# Patient Record
Sex: FEMALE | Race: WHITE | NOT HISPANIC OR LATINO | Employment: FULL TIME | URBAN - METROPOLITAN AREA
[De-identification: names, ages, dates, MRNs, and addresses within clinical notes are randomized per-mention and may not be internally consistent; named-entity substitution may affect disease eponyms.]

---

## 2017-01-17 ENCOUNTER — ALLSCRIPTS OFFICE VISIT (OUTPATIENT)
Dept: OTHER | Facility: OTHER | Age: 57
End: 2017-01-17

## 2017-01-17 LAB
BILIRUB UR QL STRIP: NEGATIVE
CLARITY UR: NORMAL
COLOR UR: YELLOW
GLUCOSE (HISTORICAL): NEGATIVE
HGB UR QL STRIP.AUTO: 3
KETONES UR STRIP-MCNC: NEGATIVE MG/DL
LEUKOCYTE ESTERASE UR QL STRIP: 1
NITRITE UR QL STRIP: NEGATIVE
PH UR STRIP.AUTO: 5 [PH]
PROT UR STRIP-MCNC: 2 MG/DL
SP GR UR STRIP.AUTO: 1.01
UROBILINOGEN UR QL STRIP.AUTO: 0.2

## 2017-01-20 ENCOUNTER — GENERIC CONVERSION - ENCOUNTER (OUTPATIENT)
Dept: OTHER | Facility: OTHER | Age: 57
End: 2017-01-20

## 2017-02-08 ENCOUNTER — ALLSCRIPTS OFFICE VISIT (OUTPATIENT)
Dept: OTHER | Facility: OTHER | Age: 57
End: 2017-02-08

## 2017-02-17 DIAGNOSIS — R35.0 FREQUENCY OF MICTURITION: ICD-10-CM

## 2017-02-24 ENCOUNTER — TRANSCRIBE ORDERS (OUTPATIENT)
Dept: ADMINISTRATIVE | Facility: HOSPITAL | Age: 57
End: 2017-02-24

## 2017-02-24 ENCOUNTER — ALLSCRIPTS OFFICE VISIT (OUTPATIENT)
Dept: OTHER | Facility: OTHER | Age: 57
End: 2017-02-24

## 2017-02-24 DIAGNOSIS — Z12.31 OTHER SCREENING MAMMOGRAM: Primary | ICD-10-CM

## 2017-02-28 LAB
HPV 18 (HISTORICAL): NOT DETECTED
HPV HIGH RISK 16/18 (HISTORICAL): NOT DETECTED
HPV16 (HISTORICAL): NOT DETECTED
PAP (HISTORICAL): NORMAL

## 2017-03-22 DIAGNOSIS — E78.5 HYPERLIPIDEMIA: ICD-10-CM

## 2017-03-22 DIAGNOSIS — Z12.31 ENCOUNTER FOR SCREENING MAMMOGRAM FOR MALIGNANT NEOPLASM OF BREAST: ICD-10-CM

## 2017-03-22 DIAGNOSIS — E55.9 VITAMIN D DEFICIENCY: ICD-10-CM

## 2017-03-30 ENCOUNTER — HOSPITAL ENCOUNTER (OUTPATIENT)
Dept: RADIOLOGY | Facility: HOSPITAL | Age: 57
Discharge: HOME | End: 2017-03-30
Payer: COMMERCIAL

## 2017-03-30 DIAGNOSIS — Z12.31 OTHER SCREENING MAMMOGRAM: ICD-10-CM

## 2017-03-30 PROCEDURE — G0202 SCR MAMMO BI INCL CAD: HCPCS

## 2017-03-30 PROCEDURE — 77063 BREAST TOMOSYNTHESIS BI: CPT

## 2017-04-15 ENCOUNTER — LAB CONVERSION - ENCOUNTER (OUTPATIENT)
Dept: OTHER | Facility: OTHER | Age: 57
End: 2017-04-15

## 2017-04-15 LAB
25(OH)D3 SERPL-MCNC: 57 NG/ML (ref 30–100)
A/G RATIO (HISTORICAL): 1.6 (CALC) (ref 1–2.5)
ALBUMIN SERPL BCP-MCNC: 4.1 G/DL (ref 3.6–5.1)
ALP SERPL-CCNC: 112 U/L (ref 33–130)
ALT SERPL W P-5'-P-CCNC: 23 U/L (ref 6–29)
AST SERPL W P-5'-P-CCNC: 18 U/L (ref 10–35)
BASOPHILS # BLD AUTO: 0.4 %
BASOPHILS # BLD AUTO: 27 CELLS/UL (ref 0–200)
BILIRUB SERPL-MCNC: 0.4 MG/DL (ref 0.2–1.2)
BUN SERPL-MCNC: 14 MG/DL (ref 7–25)
BUN/CREA RATIO (HISTORICAL): NORMAL (CALC) (ref 6–22)
CALCIUM SERPL-MCNC: 9.3 MG/DL (ref 8.6–10.4)
CHLORIDE SERPL-SCNC: 105 MMOL/L (ref 98–110)
CHOLEST SERPL-MCNC: 165 MG/DL (ref 125–200)
CHOLEST/HDLC SERPL: 3.2 (CALC)
CO2 SERPL-SCNC: 27 MMOL/L (ref 20–31)
CREAT SERPL-MCNC: 0.91 MG/DL (ref 0.5–1.05)
DEPRECATED RDW RBC AUTO: 14 % (ref 11–15)
EGFR AFRICAN AMERICAN (HISTORICAL): 81 ML/MIN/1.73M2
EGFR-AMERICAN CALC (HISTORICAL): 70 ML/MIN/1.73M2
EOSINOPHIL # BLD AUTO: 1.6 %
EOSINOPHIL # BLD AUTO: 107 CELLS/UL (ref 15–500)
GAMMA GLOBULIN (HISTORICAL): 2.6 G/DL (CALC) (ref 1.9–3.7)
GLUCOSE (HISTORICAL): 90 MG/DL (ref 65–99)
HCT VFR BLD AUTO: 40 % (ref 35–45)
HDLC SERPL-MCNC: 52 MG/DL
HGB BLD-MCNC: 13.1 G/DL (ref 11.7–15.5)
LDL CHOLESTEROL (HISTORICAL): 94 MG/DL (CALC)
LYMPHOCYTES # BLD AUTO: 2137 CELLS/UL (ref 850–3900)
LYMPHOCYTES # BLD AUTO: 31.9 %
MCH RBC QN AUTO: 28.4 PG (ref 27–33)
MCHC RBC AUTO-ENTMCNC: 32.8 G/DL (ref 32–36)
MCV RBC AUTO: 86.8 FL (ref 80–100)
MONOCYTES # BLD AUTO: 462 CELLS/UL (ref 200–950)
MONOCYTES (HISTORICAL): 6.9 %
NEUTROPHILS # BLD AUTO: 3966 CELLS/UL (ref 1500–7800)
NEUTROPHILS # BLD AUTO: 59.2 %
NON-HDL-CHOL (CHOL-HDL) (HISTORICAL): 113 MG/DL (CALC)
PLATELET # BLD AUTO: 213 THOUSAND/UL (ref 140–400)
PMV BLD AUTO: 10.4 FL (ref 7.5–12.5)
POTASSIUM SERPL-SCNC: 4.4 MMOL/L (ref 3.5–5.3)
RBC # BLD AUTO: 4.6 MILLION/UL (ref 3.8–5.1)
SODIUM SERPL-SCNC: 141 MMOL/L (ref 135–146)
TOTAL PROTEIN (HISTORICAL): 6.7 G/DL (ref 6.1–8.1)
TRIGL SERPL-MCNC: 95 MG/DL
TSH SERPL DL<=0.05 MIU/L-ACNC: 1.72 MIU/L (ref 0.4–4.5)
WBC # BLD AUTO: 6.7 THOUSAND/UL (ref 3.8–10.8)

## 2017-04-17 ENCOUNTER — GENERIC CONVERSION - ENCOUNTER (OUTPATIENT)
Dept: OTHER | Facility: OTHER | Age: 57
End: 2017-04-17

## 2017-04-24 ENCOUNTER — ALLSCRIPTS OFFICE VISIT (OUTPATIENT)
Dept: OTHER | Facility: OTHER | Age: 57
End: 2017-04-24

## 2017-10-24 DIAGNOSIS — E78.5 HYPERLIPIDEMIA: ICD-10-CM

## 2017-10-24 DIAGNOSIS — E55.9 VITAMIN D DEFICIENCY: ICD-10-CM

## 2017-10-24 DIAGNOSIS — R60.9 EDEMA: ICD-10-CM

## 2017-12-29 ENCOUNTER — APPOINTMENT (OUTPATIENT)
Dept: LAB | Facility: CLINIC | Age: 57
End: 2017-12-29
Payer: COMMERCIAL

## 2017-12-29 ENCOUNTER — GENERIC CONVERSION - ENCOUNTER (OUTPATIENT)
Dept: OTHER | Facility: OTHER | Age: 57
End: 2017-12-29

## 2017-12-29 DIAGNOSIS — E55.9 VITAMIN D DEFICIENCY: ICD-10-CM

## 2017-12-29 DIAGNOSIS — R60.9 EDEMA: ICD-10-CM

## 2017-12-29 DIAGNOSIS — E78.5 HYPERLIPIDEMIA: ICD-10-CM

## 2017-12-29 LAB
ALBUMIN SERPL BCP-MCNC: 3.8 G/DL (ref 3.5–5)
ALP SERPL-CCNC: 111 U/L (ref 46–116)
ALT SERPL W P-5'-P-CCNC: 57 U/L (ref 12–78)
ANION GAP SERPL CALCULATED.3IONS-SCNC: 5 MMOL/L (ref 4–13)
AST SERPL W P-5'-P-CCNC: 27 U/L (ref 5–45)
BILIRUB SERPL-MCNC: 0.34 MG/DL (ref 0.2–1)
BUN SERPL-MCNC: 19 MG/DL (ref 5–25)
CALCIUM SERPL-MCNC: 9.1 MG/DL (ref 8.3–10.1)
CHLORIDE SERPL-SCNC: 108 MMOL/L (ref 100–108)
CHOLEST SERPL-MCNC: 199 MG/DL (ref 50–200)
CO2 SERPL-SCNC: 27 MMOL/L (ref 21–32)
CREAT SERPL-MCNC: 0.89 MG/DL (ref 0.6–1.3)
GFR SERPL CREATININE-BSD FRML MDRD: 72 ML/MIN/1.73SQ M
GLUCOSE P FAST SERPL-MCNC: 108 MG/DL (ref 65–99)
HDLC SERPL-MCNC: 52 MG/DL (ref 40–60)
LDLC SERPL CALC-MCNC: 129 MG/DL (ref 0–100)
POTASSIUM SERPL-SCNC: 3.9 MMOL/L (ref 3.5–5.3)
PROT SERPL-MCNC: 7.7 G/DL (ref 6.4–8.2)
SODIUM SERPL-SCNC: 140 MMOL/L (ref 136–145)
TRIGL SERPL-MCNC: 89 MG/DL
TSH SERPL DL<=0.05 MIU/L-ACNC: 3.2 UIU/ML (ref 0.36–3.74)

## 2017-12-29 PROCEDURE — 84443 ASSAY THYROID STIM HORMONE: CPT

## 2017-12-29 PROCEDURE — 80053 COMPREHEN METABOLIC PANEL: CPT

## 2017-12-29 PROCEDURE — 36415 COLL VENOUS BLD VENIPUNCTURE: CPT

## 2017-12-29 PROCEDURE — 80061 LIPID PANEL: CPT

## 2018-01-09 NOTE — PROGRESS NOTES
Assessment    1  Well adult exam (V70 0) (Z00 00)   2  Dense breast tissue on mammogram (793 89) (R92 2)   3  Screen for colon cancer (V76 51) (Z12 11)   4  Esophageal reflux (530 81) (K21 9)   5  Hyperlipidemia (272 4) (E78 5)    Plan  Edema, Hyperlipidemia    · (1) TSH; Status:Active; Requested UNR:[id number];   Hyperlipidemia    · (1) LIPID PANEL, FASTING; Status:Active; Requested KPM:[id number];   Hyperlipidemia, Low vitamin D level    · (1) COMPREHENSIVE METABOLIC PANEL; Status:Active; Requested FPT:[id number];   Screen for colon cancer    · COLONOSCOPY; Status:Active; Requested [id number]; Discussion/Summary  health maintenance visit healthy adult female Currently, she eats an adequate diet  the risks and benefits of cervical cancer screening were discussed cervical cancer screening is current Breast cancer screening: the risks and benefits of breast cancer screening were discussed and mammogram is current  Colorectal cancer screening: the risks and benefits of colorectal cancer screening were discussed and colorectal cancer screening is current  Osteoporosis screening: the risks and benefits of osteoporosis screening were discussed and bone mineral density testing is current  The risks and benefits of immunizations were discussed  Advice and education were given regarding nutrition, weight loss, calcium supplements and vitamin D supplements  Patient discussion: discussed with the patient, discussed with the patient's family  LABS STABLE  CONTINUE PRESENT MEDS   MAMMO UP TO DATE- DENSE BREASTS  FOLLOW UP 1 YEAR   IMM UP TO DATE;   GERD- TRY TO MOVE PPI TO QOD TO LIMIT DOSING  CONTIJUE WITH WT LOSS     Chief Complaint  PATIENT HERE FOR ANNUAL EXAM; SHE FEELS WELL; SHE IS ON ATOrVAsTATIN AND FEELS WELL  HER LABS ARE STABLE   HER MAMMO IS UP TO DATE  SHE SEES DR Frida Gibbons      History of Present Illness  HM, Adult Female: The patient is being seen for a health maintenance evaluation     General Health: She has regular dental visits  She denies vision problems  She denies hearing loss  Immunizations status: not up to date   TETANUS 2011  Lifestyle:  She consumes a diverse and healthy diet  She exercises regularly  She does not use tobacco  She denies alcohol use  Reproductive health: the patient is postmenopausal   she reports normal menses  Screening: cancer screening reviewed and current  metabolic screening reviewed and current  risk screening reviewed and current  HPI: PT FEELS WELL  SHE HAD DENSE BREASTS AND HAD 3D Aneta Stager UP TO DATE       Review of Systems    Constitutional: No fever, no chills, feels well, no tiredness, no recent weight gain or weight loss, not feeling poorly, no recent weight gain, not feeling tired and no recent weight loss  Eyes: No complaints of eye pain, no red eyes, no eyesight problems, no discharge, no dry eyes, no itching of eyes, no eye pain, no eyesight problems, eyes not red and no purulent discharge from the eyes  ENT: no complaints of earache, no loss of hearing, no nose bleeds, no nasal discharge, no sore throat, no hoarseness, no earache, no nosebleeds, no sore throat, no hearing loss, no nasal discharge and no hoarseness  Cardiovascular: No complaints of slow heart rate, no fast heart rate, no chest pain, no palpitations, no leg claudication, no lower extremity edema, the heart rate was not slow, no chest pain, no intermittent leg claudication, the heart rate was not fast and no palpitations  Respiratory: No complaints of shortness of breath, no wheezing, no cough, no SOB on exertion, no orthopnea, no PND, no shortness of breath, no cough, no orthopnea, no wheezing, no shortness of breath during exertion and no PND  Gastrointestinal: No complaints of abdominal pain, no constipation, no nausea or vomiting, no diarrhea, no bloody stools, no abdominal pain, no nausea, no vomiting and no constipation     Genitourinary: No complaints of dysuria, no incontinence, no pelvic pain, no dysmenorrhea, no vaginal discharge or bleeding, no dysuria and no incontinence  Musculoskeletal: No complaints of arthralgias, no myalgias, no joint swelling or stiffness, no limb pain or swelling, no arthralgias and no myalgias  Integumentary: No complaints of skin rash or lesions, no itching, no skin wounds, no breast pain or lump, no rashes and no itching  Neurological: No complaints of headache, no confusion, no convulsions, no numbness, no dizziness or fainting, no tingling, no limb weakness, no difficulty walking, no headache and no confusion  Psychiatric: Not suicidal, no sleep disturbance, no anxiety or depression, no change in personality, no emotional problems, no sleep disturbances and no depression  Endocrine: No complaints of proptosis, no hot flashes, no muscle weakness, no deepening of the voice, no feelings of weakness, no muscle weakness and no deepening of the voice  Hematologic/Lymphatic: No complaints of swollen glands, no swollen glands in the neck, does not bleed easily, does not bruise easily, no swollen glands, no tendency for easy bleeding and no tendency for easy bruising  ROS reviewed  Active Problems    1  Affective personality disorder (301 10) (F34 0)   2  Depression (311) (F32 9)   3  Eczema of both hands (692 9) (L30 9)   4  Edema (782 3) (R60 9)   5  Encounter for routine gynecological examination (V72 31) (Z01 419)   6  Esophageal reflux (530 81) (K21 9)   7  Foot Pain (Soft Tissue) (729 5)   8  Hyperlipidemia (272 4) (E78 5)   9  Low vitamin D level (268 9) (E55 9)   10  Melanocytic nevus (216 9) (D22 9)   11  Parotitis (527 2)   12  Screening for human papillomavirus (HPV) (V73 81) (Z11 51)   13  Visit for screening mammogram (V76 12) (Z12 31)   14   Well adult exam (V70 0) (Z00 00)    Past Medical History    · History of Inflamed seborrheic keratosis (702 11) (L82 0)   · History of Well adult exam (V70 0) (Z00 00)    Family History  Mother    · Family history of cardiac disorder (V17 49) (Z80 55)  Father    · Family history of cardiac disorder (V17 49) (Z82 49)    Social History    · Former smoker (C42 69) (O11 399)    Current Meds   1  ALPRAZolam 0 25 MG Oral Tablet; TAKE 1 TABLET TWICE DAILY; Therapy: 16UUB9206 to ()  Requested for: 24Apr2017 Recorded   2  Atorvastatin Calcium 20 MG Oral Tablet; Take 1 tablet daily as directed; Therapy: 59KFG3155 to (Last Rx:82Pct2986)  Requested for: 59SPO1693 Ordered   3  Omeprazole 20 MG Oral Capsule Delayed Release; Take 1 capsule twice daily; Therapy: 39THS7317 to (Last Rx:09Feb2017)  Requested for: 62JZB5557 Ordered   4  Venlafaxine HCl - 37 5 MG Oral Tablet; TAKE 1 TABLET AT BEDTIME; Therapy: 14Tcz3256 to (Evaluate:01Oct2017)  Requested for: 87UEC1938; Last   Rx:06Oct2016 Ordered   5  Vitamin D3 5000 UNIT Oral Capsule; take 1 capsule daily; Therapy: (Recorded:20Jun2013) to Recorded    Allergies    1  No Known Drug Allergies    Vitals   Recorded: 24Apr2017 03:14PM   Temperature 98 5 F   Heart Rate 80   Respiration 16   Systolic 318   Diastolic 72   Height 5 ft 7 25 in   Weight 201 lb 6 4 oz   BMI Calculated 31 31   BSA Calculated 2 03     Physical Exam    Constitutional   General appearance: No acute distress, well appearing and well nourished  Eyes   Conjunctiva and lids: No swelling, erythema or discharge  Pupils and irises: Equal, round, reactive to light  Ears, Nose, Mouth, and Throat   External inspection of ears and nose: Normal     Otoscopic examination: Tympanic membranes translucent with normal light reflex  Canals patent without erythema  Nasal mucosa, septum, and turbinates: Normal without edema or erythema  Lips, teeth, and gums: Normal, good dentition  Oropharynx: Normal with no erythema, edema, exudate or lesions  Inspection of the oropharynx showed fully visible tonsils, uvula and soft palate (Mallampati class 1)   The palate examination showed no abnormalities  The tongue was normal  The tonsils were normal    Neck   Neck: Supple, symmetric, trachea midline, no masses  Thyroid: Normal, no thyromegaly  The thyroid was not enlarged  Pulmonary   Respiratory effort: No increased work of breathing or signs of respiratory distress  Percussion of chest: Normal     Palpation of chest: Normal     Auscultation of lungs: Clear to auscultation  Cardiovascular   Palpation of heart: Normal PMI, no thrills  Auscultation of heart: Normal rate and rhythm, normal S1 and S2, no murmurs  Carotid pulses: 2+ bilaterally  no bruit heard over the right carotid and no bruit heard over the left carotid  Abdominal aorta: Normal     Pedal pulses: 2+ bilaterally  Examination of extremities for edema and/or varicosities: Normal     Abdomen   Abdomen: Non-tender, no masses  Bowel sounds were normal  The abdomen was soft and nontender  no masses palpated  Liver and spleen: No hepatomegaly or splenomegaly  Lymphatic   Palpation of lymph nodes in neck: No lymphadenopathy  Musculoskeletal   Gait and station: Normal     Digits and nails: Normal without clubbing or cyanosis  Joints, bones, and muscles: Normal     Range of motion: Normal     Stability: Normal     Muscle strength/tone: Normal     Skin   Skin and subcutaneous tissue: Normal without rashes or lesions  Palpation of skin and subcutaneous tissue: Normal turgor  Neurologic   Cranial nerves: Cranial nerves II-XII intact  Reflexes: 2+ and symmetric  Sensation: No sensory loss  Psychiatric   Judgment and insight: Normal     Orientation to person, place, and time: Normal     Recent and remote memory: Intact  Mood and affect: Normal        Future Appointments    Date/Time Provider Specialty Site   03/09/2018 01:00 PM Constance Perez DO Obstetrics/Gynecology [facility]     Signatures   Electronically signed by : Asha Rodriguez DO;  Apr 25 2017  7:29AM EST                       (Author)

## 2018-01-10 NOTE — RESULT NOTES
Verified Results  (1) BASIC METABOLIC PROFILE [id number] 08:48AM [facility]     Test Name Result Flag Reference   GLUCOSE 102 mg/dL H 65-99   Fasting reference interval   UREA NITROGEN (BUN) 17 mg/dL  7-25   CREATININE 1 05 mg/dL  0 50-1 05   For patients >52years of age, the reference limit  for Creatinine is approximately 13% higher for people  identified as -American  eGFR NON-AFR  AMERICAN 59 mL/min/1 73m2 L > OR = 60   eGFR AFRICAN AMERICAN 68 mL/min/1 73m2  > OR = 60   BUN/CREATININE RATIO   8-92   NOT APPLICABLE (calc)   SODIUM 139 mmol/L  135-146   POTASSIUM 4 4 mmol/L  3 5-5 3   CHLORIDE 102 mmol/L     CARBON DIOXIDE 27 mmol/L  20-31   CALCIUM 9 4 mg/dL  8 6-10 4     (Q) HEMOGLOBIN A1c [id number] 08:48AM [facility]   REPORT COMMENT:  FASTING:YES     Test Name Result Flag Reference   HEMOGLOBIN A1c 5 7 % of total Hgb H <5 7   According to ADA guidelines, hemoglobin A1c <7 0%  represents optimal control in non-pregnant diabetic  patients  Different metrics may apply to specific  patient populations  Standards of Medical Care in  [phone]  Diabetes Care  2013;36:s11-s66     For the purpose of screening for the presence of  diabetes  <5 7%       Consistent with the absence of diabetes  5 7-6 4%    Consistent with increased risk for diabetes              (prediabetes)  >or=6 5%    Consistent with diabetes     This assay result is consistent with an increased risk  of diabetes  Currently, no consensus exists for use of hemoglobin  A1c for diagnosis of diabetes for children         Discussion/Summary   Labs look great!!

## 2018-01-13 VITALS
DIASTOLIC BLOOD PRESSURE: 72 MMHG | HEIGHT: 67 IN | WEIGHT: 201.4 LBS | TEMPERATURE: 98.5 F | SYSTOLIC BLOOD PRESSURE: 120 MMHG | BODY MASS INDEX: 31.61 KG/M2 | RESPIRATION RATE: 16 BRPM | HEART RATE: 80 BPM

## 2018-01-13 VITALS
BODY MASS INDEX: 30.62 KG/M2 | HEIGHT: 68 IN | RESPIRATION RATE: 16 BRPM | TEMPERATURE: 97.1 F | WEIGHT: 202 LBS | HEART RATE: 78 BPM | DIASTOLIC BLOOD PRESSURE: 74 MMHG | SYSTOLIC BLOOD PRESSURE: 110 MMHG

## 2018-01-13 VITALS
WEIGHT: 204 LBS | SYSTOLIC BLOOD PRESSURE: 118 MMHG | BODY MASS INDEX: 32.02 KG/M2 | DIASTOLIC BLOOD PRESSURE: 72 MMHG | HEIGHT: 67 IN

## 2018-01-15 NOTE — RESULT NOTES
Verified Results  (1) URINE CULTURE [id number] 12:00AM Adiel Andrade     Test Name Result Flag Reference   CULTURE, URINE, ROUTINE  A    CULTURE, URINE, ROUTINE         MICRO NUMBER:      [id number]    TEST STATUS:       FINAL    SPECIMEN SOURCE:   URINE    SPECIMEN QUALITY:  ADEQUATE    RESULT:            Greater than 100,000 CFU/mL of Proteus mirabilis                       This organism may show imipenem resistance by                       mechanisms other than a carbapenemase  P mirabilis                            ----------------                            INT   IKER     AMOX/CLAVULANATE       S     <=2 0     AMPICILLIN             S     <=2 0     AMP/SULBACTAM          S     <=2 0     CEFAZOLIN              NR    <=4 0 **1     CEFEPIME               S     <=1 0     CEFTRIAXONE            S     <=1 0     CIPROFLOXACIN          S     <=0 25     ERTAPENEM              S     <=0 5     GENTAMICIN             S     <=1 0     IMIPENEM               I     2 0     LEVOFLOXACIN           S     <=0 12     NITROFURANTOIN         R     128 0     PIP/TAZOBACTAM         S     <=4 0     TOBRAMYCIN             S     <=1 0     TRIMETHOPRIM/SULFA     S     <=20 0  S=Susceptible  I=Intermediate  R=Resistant  * = Not Tested  NR = Not Reported  **NN = See Therapy Comments  THERAPY COMMENTS      Note 1:      ORAL therapy: A cefazolin IKER of < 32 predicts      susceptibility to the oral agents cefaclor,      cefdinir, cefpodoxime, cefprozil, cefuroxime,      cephalexin, and loracarbef when used for therapy      of uncomplicated UTIs due to E  coli,      K  pneumoniae, and P  mirabilis  PARENTERAL therapy: A cefazolin IKER of > 8      indicates resistance to parenteral cefazolin  An alternate test method must be performed to      to confirm susceptibility to parenteral cefazolin         Plan  Urinary frequency    · (1) URINE CULTURE; Source:Urine, Clean Catch; Status:Active; Requested  for:15Ofx9113;

## 2018-01-16 NOTE — RESULT NOTES
Message   MAIL RESULTS TO PT     Verified Results  (1) CBC/PLT/DIFF [id number] 08:30AM [employer]     Test Name Result Flag Reference   WHITE BLOOD CELL COUNT 8 6 Thousand/uL  3 8-10 8   RED BLOOD CELL COUNT 4 62 Million/uL  3 80-5 10   HEMOGLOBIN 13 2 g/dL  11 7-15 5   HEMATOCRIT 40 4 %  35 0-45 0   MCV 87 4 fL  80 0-100 0   MCH 28 5 pg  27 0-33 0   MCHC 32 6 g/dL  32 0-36 0   RDW 14 3 %  11 0-15 0   PLATELET COUNT 995 Thousand/uL  140-400   MPV 10 5 fL  7 5-11 5   ABSOLUTE NEUTROPHILS 5444 cells/uL  6087-1264   ABSOLUTE LYMPHOCYTES 2408 cells/uL  850-3900   ABSOLUTE MONOCYTES 585 cells/uL  200-950   ABSOLUTE EOSINOPHILS 129 cells/uL     ABSOLUTE BASOPHILS 34 cells/uL  0-200   NEUTROPHILS 63 3 %     LYMPHOCYTES 28 0 %     MONOCYTES 6 8 %     EOSINOPHILS 1 5 %     BASOPHILS 0 4 %       (1) COMPREHENSIVE METABOLIC PANEL [id number] 00:76FQ [employer]     Test Name Result Flag Reference   GLUCOSE 103 mg/dL H 65-99   Fasting reference interval   UREA NITROGEN (BUN) 19 mg/dL  7-25   CREATININE 1 07 mg/dL H 0 50-1 05   For patients >52years of age, the reference limit  for Creatinine is approximately 13% higher for people  identified as -American  eGFR NON-AFR   AMERICAN 58 mL/min/1 73m2 L > OR = 60   eGFR AFRICAN AMERICAN 67 mL/min/1 73m2  > OR = 60   BUN/CREATININE RATIO 18 (calc)  6-22   SODIUM 140 mmol/L  135-146   POTASSIUM 4 4 mmol/L  3 5-5 3   CHLORIDE 103 mmol/L     CARBON DIOXIDE 21 mmol/L  19-30   CALCIUM 9 3 mg/dL  8 6-10 4   PROTEIN, TOTAL 6 9 g/dL  6 1-8 1   ALBUMIN 4 2 g/dL  3 6-5 1   GLOBULIN 2 7 g/dL (calc)  1 9-3 7   ALBUMIN/GLOBULIN RATIO 1 6 (calc)  1 0-2 5   BILIRUBIN, TOTAL 0 4 mg/dL  0 2-1 2   ALKALINE PHOSPHATASE 94 U/L     AST 17 U/L  10-35   ALT 23 U/L  6-29     (1) LIPID PANEL, FASTING [id number] 08:30AM [employer]     Test Name Result Flag Reference   CHOLESTEROL, TOTAL 162 mg/dL  125-200   HDL CHOLESTEROL 45 mg/dL L > OR = 46 TRIGLICERIDES 909 mg/dL  <150   LDL-CHOLESTEROL 94 mg/dL (calc)  <130   Desirable range <100 mg/dL for patients with CHD or  diabetes and <70 mg/dL for diabetic patients with  known heart disease  CHOL/HDLC RATIO 3 6 (calc)  < OR = 5 0   NON HDL CHOLESTEROL 117 mg/dL (calc)     Target for non-HDL cholesterol is 30 mg/dL higher than   LDL cholesterol target  (Q) TSH, 3RD GENERATION [id number] 08:30AM [facility]   REPORT COMMENT:  FASTING:YES     Test Name Result Flag Reference   TSH 2 22 mIU/L  0 40-4 50       Discussion/Summary   LABS LOOK GOOD- WILL FOLLOW KIDNEY FUNCTION

## 2018-01-16 NOTE — PROGRESS NOTES
Assessment    1  Well adult exam (V70 0) (Z00 00)   2  Encounter for preventive health examination (V70 0) (Z00 00)   3  Low vitamin D level (268 9) (E55 9)    Plan  Abnormal weight gain    · Belviq 10 MG Oral Tablet   · Contrave 8-90 MG Oral Tablet Extended Release 12 Hour; 1 PO Q AM FOR A  WEEK THEN 1 PO BID FOR A WEEK THEN 2 PO BID  Depression, Health Maintenance    · ALPRAZolam 0 25 MG Oral Tablet; TAKE 1 TABLET TWICE DAILY  Edema    · Triamterene-HCTZ 37 5-25 MG Oral Tablet  Health Maintenance    · (1) CBC/PLT/DIFF; Status:Active; Requested for:10Mar2016;    · (1) COMPREHENSIVE METABOLIC PANEL; Status:Active; Requested for:10Mar2016;    · (1) LIPID PANEL, FASTING; Status:Active; Requested for:10Mar2016;    · (1) TSH; Status:Active; Requested for:10Mar2016;   Low vitamin D level    · (1) VITAMIN D 25-HYDROXY; Status:Active; Requested for:10Mar2016;     Discussion/Summary  health maintenance visit healthy adult female Currently, she eats an adequate diet  the risks and benefits of cervical cancer screening were discussed cervical cancer screening is current Breast cancer screening: the risks and benefits of breast cancer screening were discussed and mammogram is current  Colorectal cancer screening: the risks and benefits of colorectal cancer screening were discussed and colorectal cancer screening is current  Osteoporosis screening: the risks and benefits of osteoporosis screening were discussed and bone mineral density testing is current  The risks and benefits of immunizations were discussed  Advice and education were given regarding nutrition, weight loss, calcium supplements and vitamin D supplements  Patient discussion: discussed with the patient, discussed with the patient's family  OBATIN LABS  MAMMO UP TO DATE  CONSIDER CONTRAVE- PT TO CHECK ON INSURANCE COVERAGE;    Possible side effects of new medications were reviewed with the patient/guardian today     The patient was counseled regarding diagnostic results, instructions for management, risk factor reductions, prognosis, patient and family education, impressions, risks and benefits of treatment options, importance of compliance with treatment  Chief Complaint  Patient HERE FOR FOLLOW UP; SHE FEELS WELL; SHE OFFERS NO COMPLAINTS; History of Present Illness  HM, Adult Female: The patient is being seen for a health maintenance evaluation  General Health: She has regular dental visits  She denies vision problems  She denies hearing loss  Immunizations status: not up to date   TETANUS 2011  Lifestyle:  She consumes a diverse and healthy diet  She exercises regularly  She does not use tobacco  She denies alcohol use  Reproductive health: the patient is postmenopausal   she reports normal menses  Screening: cancer screening reviewed and current  metabolic screening reviewed and current  risk screening reviewed and current  HPI: PATIENT HERE FOR ANNUAL EXAM; SHE FEELS WELL; SHE IS STARTING TO EXERCISE; Review of Systems    Constitutional: No fever, no chills, feels well, no tiredness, no recent weight gain or weight loss, not feeling poorly and not feeling tired  Eyes: No complaints of eye pain, no red eyes, no eyesight problems, no discharge, no dry eyes, no itching of eyes, no eye pain, no eyesight problems, eyes not red and no purulent discharge from the eyes  ENT: no complaints of earache, no loss of hearing, no nose bleeds, no nasal discharge, no sore throat, no hoarseness, no earache, no nosebleeds, no sore throat, no hearing loss, no nasal discharge and no hoarseness  Cardiovascular: No complaints of slow heart rate, no fast heart rate, no chest pain, no palpitations, no leg claudication, no lower extremity edema, the heart rate was not slow, no chest pain, the heart rate was not fast and no palpitations     Respiratory: No complaints of shortness of breath, no wheezing, no cough, no SOB on exertion, no orthopnea, no PND    Gastrointestinal: No complaints of abdominal pain, no constipation, no nausea or vomiting, no diarrhea, no bloody stools, no abdominal pain and no constipation  Genitourinary: No complaints of dysuria, no incontinence, no pelvic pain, no dysmenorrhea, no vaginal discharge or bleeding, no dysuria and no incontinence  Musculoskeletal: No complaints of arthralgias, no myalgias, no joint swelling or stiffness, no limb pain or swelling, no arthralgias and no myalgias  Integumentary: No complaints of skin rash or lesions, no itching, no skin wounds, no breast pain or lump  Neurological: No complaints of headache, no confusion, no convulsions, no numbness, no dizziness or fainting, no tingling, no limb weakness, no difficulty walking  Psychiatric: Not suicidal, no sleep disturbance, no anxiety or depression, no change in personality, no emotional problems  Endocrine: No complaints of proptosis, no hot flashes, no muscle weakness, no deepening of the voice, no feelings of weakness  Hematologic/Lymphatic: No complaints of swollen glands, no swollen glands in the neck, does not bleed easily, does not bruise easily  ROS reviewed  Active Problems    1  Abnormal weight gain (783 1) (R63 5)   2  Affective personality disorder (301 10) (F34 0)   3  Allergic rhinitis (477 9) (J30 9)   4  Depression (311) (F32 9)   5  Edema (782 3) (R60 9)   6  Encounter for routine gynecological examination (V72 31) (Z01 419)   7  Esophageal reflux (530 81) (K21 9)   8  Foot Pain (Soft Tissue) (729 5)   9  Hyperlipidemia (272 4) (E78 5)   10  Melanocytic nevus (216 9) (D22 9)   11  Need for prophylactic vaccination and inoculation against influenza (V04 81) (Z23)   12  Nummular dermatitis (692 9) (L30 0)   13  Parotitis (527 2)   14  Visit for screening mammogram (V76 12) (Z12 31)   15   Well adult exam (V70 0) (Z00 00)    Past Medical History    · History of Inflamed seborrheic keratosis (702 11) (L82 0)    Family History    · Family history of cardiac disorder (V17 49) (Z82 49)    · Family history of cardiac disorder (V17 49) (Z82 49)    Social History    · Former smoker (V15 82) (C60 120)    Current Meds   1  ALPRAZolam 0 25 MG Oral Tablet; TAKE 1 TABLET TWICE DAILY; Therapy: 82DVH0259 to (Evaluate:23Xmj0036)  Requested for: 58TLG9677; Last   Rx:65Ksu9215 Ordered   2  Atorvastatin Calcium 20 MG Oral Tablet; Take 1 tablet daily as directed; Therapy: 83FRY2411 to (Last Rx:29Kvl7549)  Requested for: 82Zwt7865 Ordered   3  Belviq 10 MG Oral Tablet; TAKE 1 TABLET TWICE A DAY 1 HOUR BEFORE A MEAL; Therapy: 63DCZ8672 to (Evaluate:36Nlw5909)  Requested for: 64WIZ3354; Last   NU:77AYV1150 Ordered   4  Fluticasone Propionate 50 MCG/ACT Nasal Suspension; instill 2 sprays into each nostril   every morning; Therapy: 70CIB5413 to (Evaluate:98Aaw3154)  Requested for: 50CTO5956; Last   Rx:10Xoo3180 Ordered   5  Omeprazole 20 MG Oral Capsule Delayed Release; Take 1 capsule twice daily; Therapy: 66RBT1487 to (Last Rx:93Xlf1634)  Requested for: 033 [id number] 47 39 Ordered   6  Triamterene-HCTZ 37 5-25 MG Oral Tablet; take 1 tablet daily as needed; Therapy: 02SUB2114 to (Last Hector Mary Jo)  Requested for: 56Kmb6671 Ordered   7  Venlafaxine HCl ER 37 5 MG Oral Capsule Extended Release 24 Hour; TAKE 1   CAPSULE DAILY WITH FOOD; Therapy: 34MVU6155 to (Last Rx:97Pee5942)  Requested for: 72Ans0556 Ordered   8  Vitamin D3 5000 UNIT Oral Capsule; take 1 capsule daily; Therapy: (Recorded:20Jun2013) to Recorded    Allergies    1  No Known Drug Allergies    Vitals   Recorded: 62RBW6686 03:15PM   Temperature 97 9 F   Heart Rate 88   Respiration 16   Systolic 715   Diastolic 70   Height 5 ft 6 in   Weight 236 lb    BMI Calculated 38 09   BSA Calculated 2 15     Physical Exam    Constitutional   General appearance: No acute distress, well appearing and well nourished  Eyes   Conjunctiva and lids: No swelling, erythema or discharge      Pupils and irises: Equal, round, reactive to light  Ears, Nose, Mouth, and Throat   External inspection of ears and nose: Normal     Otoscopic examination: Tympanic membranes translucent with normal light reflex  Canals patent without erythema  Nasal mucosa, septum, and turbinates: Normal without edema or erythema  Lips, teeth, and gums: Normal, good dentition  Oropharynx: Normal with no erythema, edema, exudate or lesions  Inspection of the oropharynx showed fully visible tonsils, uvula and soft palate (Mallampati class 1)  The palate examination showed no abnormalities  The tongue was normal  The tonsils were normal    Neck   Neck: Supple, symmetric, trachea midline, no masses  Thyroid: Normal, no thyromegaly  The thyroid was not enlarged  Pulmonary   Respiratory effort: No increased work of breathing or signs of respiratory distress  Percussion of chest: Normal     Palpation of chest: Normal     Auscultation of lungs: Clear to auscultation  Cardiovascular   Palpation of heart: Normal PMI, no thrills  Auscultation of heart: Normal rate and rhythm, normal S1 and S2, no murmurs  Carotid pulses: 2+ bilaterally  Abdominal aorta: Normal     Pedal pulses: 2+ bilaterally  Examination of extremities for edema and/or varicosities: Normal     Abdomen   Abdomen: Non-tender, no masses  Liver and spleen: No hepatomegaly or splenomegaly  Lymphatic   Palpation of lymph nodes in neck: No lymphadenopathy  Musculoskeletal   Gait and station: Normal     Digits and nails: Normal without clubbing or cyanosis  Joints, bones, and muscles: Normal     Range of motion: Normal     Stability: Normal     Muscle strength/tone: Normal     Skin   Skin and subcutaneous tissue: Normal without rashes or lesions  Palpation of skin and subcutaneous tissue: Normal turgor  Neurologic   Cranial nerves: Cranial nerves II-XII intact  Reflexes: 2+ and symmetric  Sensation: No sensory loss      Psychiatric Judgment and insight: Normal     Orientation to person, place, and time: Normal     Recent and remote memory: Intact  Mood and affect: Normal        Signatures   Electronically signed by :  Asha Rodriguez DO; Mar 10 2016  3:45PM EST                       (Author)

## 2018-01-16 NOTE — RESULT NOTES
Verified Results  (1) CBC/PLT/DIFF 30JEI5454 09:25AM Teodoro Green     Test Name Result Flag Reference   WHITE BLOOD CELL COUNT 6 7 Thousand/uL  3 8-10 8   RED BLOOD CELL COUNT 4 60 Million/uL  3 80-5 10   HEMOGLOBIN 13 1 g/dL  11 7-15 5   HEMATOCRIT 40 0 %  35 0-45 0   MCV 86 8 fL  80 0-100 0   MCH 28 4 pg  27 0-33 0   MCHC 32 8 g/dL  32 0-36 0   RDW 14 0 %  11 0-15 0   PLATELET COUNT 743 Thousand/uL  140-400   MPV 10 4 fL  7 5-12 5   ABSOLUTE NEUTROPHILS 3966 cells/uL  7925-4609   ABSOLUTE LYMPHOCYTES 2137 cells/uL  850-3900   ABSOLUTE MONOCYTES 462 cells/uL  200-950   ABSOLUTE EOSINOPHILS 107 cells/uL     ABSOLUTE BASOPHILS 27 cells/uL  0-200   NEUTROPHILS 59 2 %     LYMPHOCYTES 31 9 %     MONOCYTES 6 9 %     EOSINOPHILS 1 6 %     BASOPHILS 0 4 %       (1) COMPREHENSIVE METABOLIC PANEL 23LTL4595 97:83HK Teodoro Green     Test Name Result Flag Reference   GLUCOSE 90 mg/dL  65-99   Fasting reference interval   UREA NITROGEN (BUN) 14 mg/dL  7-25   CREATININE 0 91 mg/dL  0 50-1 05   For patients >52years of age, the reference limit  for Creatinine is approximately 13% higher for people  identified as -American  eGFR NON-AFR   AMERICAN 70 mL/min/1 73m2  > OR = 60   eGFR AFRICAN AMERICAN 81 mL/min/1 73m2  > OR = 60   BUN/CREATININE RATIO   7-28   NOT APPLICABLE (calc)   SODIUM 141 mmol/L  135-146   POTASSIUM 4 4 mmol/L  3 5-5 3   CHLORIDE 105 mmol/L     CARBON DIOXIDE 27 mmol/L  20-31   CALCIUM 9 3 mg/dL  8 6-10 4   PROTEIN, TOTAL 6 7 g/dL  6 1-8 1   ALBUMIN 4 1 g/dL  3 6-5 1   GLOBULIN 2 6 g/dL (calc)  1 9-3 7   ALBUMIN/GLOBULIN RATIO 1 6 (calc)  1 0-2 5   BILIRUBIN, TOTAL 0 4 mg/dL  0 2-1 2   ALKALINE PHOSPHATASE 112 U/L     AST 18 U/L  10-35   ALT 23 U/L  6-29     (1) LIPID PANEL, FASTING 14Apr2017 09:25AM Teodoro Green     Test Name Result Flag Reference   CHOLESTEROL, TOTAL 165 mg/dL  125-200   HDL CHOLESTEROL 52 mg/dL  > OR = 46   TRIGLICERIDES 95 mg/dL  <180 LDL-CHOLESTEROL 94 mg/dL (calc)  <130   Desirable range <100 mg/dL for patients with CHD or  diabetes and <70 mg/dL for diabetic patients with  known heart disease  CHOL/HDLC RATIO 3 2 (calc)  < OR = 5 0   NON HDL CHOLESTEROL 113 mg/dL (calc)     Target for non-HDL cholesterol is 30 mg/dL higher than   LDL cholesterol target  (Q) TSH, 3RD GENERATION 14Apr2017 09:25AM Heidyjanelle Dugan     Test Name Result Flag Reference   TSH 1 72 mIU/L  0 40-4 50     *(Q) VITAMIN D, 25-HYDROXY, LC/MS/MS 14Apr2017 09:25AM Heidyjanelle Dugan   REPORT COMMENT:  FASTING:YES     Test Name Result Flag Reference   VITAMIN D, 25-OH, TOTAL 57 ng/mL     Vitamin D Status         25-OH Vitamin D:     Deficiency:                    <20 ng/mL  Insufficiency:             20 - 29 ng/mL  Optimal:                 > or = 30 ng/mL     For 25-OH Vitamin D testing on patients on   D2-supplementation and patients for whom quantitation   of D2 and D3 fractions is required, the QuestAssureD(TM)  25-OH VIT D, (D2,D3), LC/MS/MS is recommended: order   code 47558 (patients >2yrs)  For more information on this test, go to:  http://education  [employer]/faq/RQS311  (This link is being provided for   informational/educational purposes only )

## 2018-01-22 VITALS
DIASTOLIC BLOOD PRESSURE: 68 MMHG | BODY MASS INDEX: 31.83 KG/M2 | TEMPERATURE: 97.5 F | HEART RATE: 72 BPM | RESPIRATION RATE: 16 BRPM | SYSTOLIC BLOOD PRESSURE: 114 MMHG | HEIGHT: 68 IN | WEIGHT: 210 LBS

## 2018-01-23 NOTE — RESULT NOTES
Discussion/Summary   CHOLESTEROL IS GOOD;  BLOOD SUGAR IS HIGHER THAN BEFORE- OVERALL GOOD!! Verified Results  (1) COMPREHENSIVE METABOLIC PANEL [id number] 58:56ZE Sergey Rivear   TW Order Number: [id number]     Test Name Result Flag Reference   SODIUM 140 mmol/L  136-145   POTASSIUM 3 9 mmol/L  3 5-5 3   CHLORIDE 108 mmol/L  100-108   CARBON DIOXIDE 27 mmol/L  21-32   ANION GAP (CALC) 5 mmol/L  4-13   BLOOD UREA NITROGEN 19 mg/dL  5-25   CREATININE 0 89 mg/dL  0 60-1 30   Standardized to IDMS reference method   CALCIUM 9 1 mg/dL  8 3-10 1   BILI, TOTAL 0 34 mg/dL  0 20-1 00   ALK PHOSPHATAS 111 U/L     ALT (SGPT) 57 U/L  12-78   Specimen collection should occur prior to Sulfasalazine and/or Sulfapyridine administration due to the potential for falsely depressed results  AST(SGOT) 27 U/L  5-45   Specimen collection should occur prior to Sulfasalazine administration due to the potential for falsely depressed results  ALBUMIN 3 8 g/dL  3 5-5 0   TOTAL PROTEIN 7 7 g/dL  6 4-8 2   eGFR 72 ml/min/1 73sq m     [facility] Disease Education Program recommendations are as follows:  GFR calculation is accurate only with a steady state creatinine  Chronic Kidney disease less than 60 ml/min/1 73 sq  meters  Kidney failure less than 15 ml/min/1 73 sq  meters  GLUCOSE FASTING 108 mg/dL H 65-99   Specimen collection should occur prior to Sulfasalazine administration due to the potential for falsely depressed results  Specimen collection should occur prior to Sulfapyridine administration due to the potential for falsely elevated results  (1) LIPID PANEL, FASTING 43Ufe8204 08:49AM Sergey Rivera    Order Number: [id number]     Test Name Result Flag Reference   CHOLESTEROL 199 mg/dL     HDL,DIRECT 52 mg/dL  40-60   Specimen collection should occur prior to Metamizole administration due to the potential for falsley depressed results     LDL CHOLESTEROL CALCULATED 129 mg/dL H 0-100   Triglyceride:        Normal <150 mg/dl   Borderline High 150-199 mg/dl   High 200-499 mg/dl   Very High >499 mg/dl      Cholesterol:       Desirable <200 mg/dl    Borderline High 200-239 mg/dl    High >239 mg/dl      HDL Cholesterol:       High>59 mg/dL    Low <41 mg/dL      This screening LDL is a calculated result  It does not have the accuracy of the Direct Measured LDL in the monitoring of patients with hyperlipidemia and/or statin therapy  Direct Measure LDL (FLD072) must be ordered separately in these patients  TRIGLYCERIDES 89 mg/dL  <=150   Specimen collection should occur prior to N-Acetylcysteine or Metamizole administration due to the potential for falsely depressed results  (1) TSH [id number] 08:49AM Hussein VILLA Order Number: [id number]     Test Name Result Flag Reference   TSH 3 200 uIU/mL  0 358-3 740   Patients undergoing fluorescein dye angiography may retain small amounts of fluorescein in the body for 48-72 hours post procedure  Samples containing fluorescein can produce falsely depressed TSH values  If the patient had this procedure,a specimen should be resubmitted post fluorescein clearance            The recommended reference ranges for TSH during pregnancy are as follows:  First trimester 0 1 to 2 5 uIU/mL  Second trimester  0 2 to 3 0 uIU/mL  Third trimester 0 3 to 3 0 uIU/m

## 2018-01-29 ENCOUNTER — OFFICE VISIT (OUTPATIENT)
Dept: FAMILY MEDICINE CLINIC | Facility: CLINIC | Age: 58
End: 2018-01-29
Payer: COMMERCIAL

## 2018-01-29 VITALS
SYSTOLIC BLOOD PRESSURE: 128 MMHG | WEIGHT: 215 LBS | RESPIRATION RATE: 16 BRPM | DIASTOLIC BLOOD PRESSURE: 76 MMHG | TEMPERATURE: 97.2 F | HEIGHT: 67 IN | BODY MASS INDEX: 33.74 KG/M2 | HEART RATE: 86 BPM

## 2018-01-29 DIAGNOSIS — R22.1 MASS OF LEFT SIDE OF NECK: Primary | ICD-10-CM

## 2018-01-29 PROCEDURE — 99213 OFFICE O/P EST LOW 20 MIN: CPT | Performed by: INTERNAL MEDICINE

## 2018-01-29 RX ORDER — ATORVASTATIN CALCIUM 20 MG/1
20 TABLET, FILM COATED ORAL
COMMUNITY
Start: 2013-11-18 | End: 2018-05-24 | Stop reason: SDUPTHER

## 2018-01-29 RX ORDER — NALTREXONE HYDROCHLORIDE AND BUPROPION HYDROCHLORIDE 8; 90 MG/1; MG/1
2 TABLET, EXTENDED RELEASE ORAL 2 TIMES DAILY
Refills: 4 | COMMUNITY
Start: 2018-01-22 | End: 2018-03-06

## 2018-01-29 RX ORDER — MAG HYDROX/ALUMINUM HYD/SIMETH 400-400-40
1 SUSPENSION, ORAL (FINAL DOSE FORM) ORAL
COMMUNITY

## 2018-01-29 RX ORDER — ALPRAZOLAM 0.25 MG/1
0.25 TABLET ORAL 2 TIMES DAILY PRN
COMMUNITY
Start: 2010-11-23 | End: 2018-04-02 | Stop reason: SDUPTHER

## 2018-01-29 RX ORDER — NICOTINE POLACRILEX 4 MG/1
1 GUM, CHEWING ORAL
COMMUNITY
Start: 2011-07-12 | End: 2019-04-05 | Stop reason: SDUPTHER

## 2018-01-29 RX ORDER — VENLAFAXINE 37.5 MG/1
37.5 TABLET ORAL
COMMUNITY
Start: 2016-08-05 | End: 2018-06-18 | Stop reason: SDUPTHER

## 2018-01-29 NOTE — PROGRESS NOTES
Assessment/Plan: Kate Frias is a 61 y o  female with:   Problem List Items Addressed This Visit     None          Subjective:     Roselia Young is a 61 y o  female who presents today with:   Chief Complaint   Patient presents with    Swollen Glands     SWOLLEN GLANDS IN HER NECK; SHE IS CONCERNED; Histories Reviewed:  No current outpatient prescriptions on file prior to visit  No current facility-administered medications on file prior to visit  No Known Allergies  No past medical history on file  Social History     Social History    Marital status: /Civil Union     Spouse name: N/A    Number of children: N/A    Years of education: N/A     Occupational History    Not on file  Social History Main Topics    Smoking status: Former Smoker    Smokeless tobacco: Never Used    Alcohol use Yes      Comment: SOCIAL    Drug use: No    Sexual activity: Not on file     Other Topics Concern    Not on file     Social History Narrative    No narrative on file       Review of Systems   Constitutional: Negative  Negative for unexpected weight change  HENT: Positive for congestion, postnasal drip, rhinorrhea, sinus pain and sinus pressure  Negative for dental problem, drooling, ear discharge, ear pain, facial swelling, hearing loss, sneezing, sore throat, tinnitus, trouble swallowing and voice change  Eyes: Negative  Respiratory: Negative  Cardiovascular: Negative  Gastrointestinal: Negative  Endocrine: Negative  Genitourinary: Negative  Musculoskeletal: Negative for neck stiffness  Neck pain: SWOLLEN GLANDS IN HER NECK; Allergic/Immunologic: Negative  Neurological: Negative  Hematological: Positive for adenopathy (SWOLLEN GLANDS IN HER NECK; )  Psychiatric/Behavioral: Negative            Objective:    /76   Pulse 86   Temp (!) 97 2 °F (36 2 °C)   Resp 16   Ht 5' 7" (1 702 m)   Wt 97 5 kg (215 lb)   BMI 33 67 kg/m²     Physical Exam Constitutional: She is oriented to person, place, and time  She appears well-developed and well-nourished  No distress  HENT:   Head: Normocephalic and atraumatic  Right Ear: External ear normal    Left Ear: External ear normal    Nose: Nose normal    Mouth/Throat: Oropharynx is clear and moist  No oropharyngeal exudate (FEW AIR FLUID LEVEL RIGHT TM; LEFT CLEAR )  Eyes: Conjunctivae and EOM are normal  Pupils are equal, round, and reactive to light  Neck: Normal range of motion  Neck supple  No JVD present  No tracheal deviation present  Thyromegaly present  LARGE FIRM MASS LEFT SIDE OF NECK ADJACENT TO THYROID TISSUE   Cardiovascular: Normal rate, regular rhythm and normal heart sounds  No murmur heard  Pulmonary/Chest: Effort normal and breath sounds normal  Stridor present  No respiratory distress  She has no wheezes  Abdominal: Soft  Bowel sounds are normal    Musculoskeletal: She exhibits no edema, tenderness or deformity  Lymphadenopathy:        Head (right side): Occipital adenopathy present  No submental and no submandibular adenopathy present  Head (left side): No submental, no submandibular and no occipital adenopathy present  She has cervical adenopathy  Right cervical: Superficial cervical and posterior cervical adenopathy present  Left cervical: No superficial cervical and no posterior cervical adenopathy present  She has no axillary adenopathy  Right: Supraclavicular adenopathy present  No inguinal adenopathy present  Left: No inguinal and no supraclavicular adenopathy present  NO AXILLARY ADENOPATHY;  NO CERVICAL ADENOPATHY; NO SUBMENTAL ADENOPATHY; NO INGUINAL ADENOPATHY   Neurological: She is alert and oriented to person, place, and time  She has normal reflexes  Skin: Skin is warm  She is diaphoretic  Nursing note and vitals reviewed  There are no Patient Instructions on file for this visit      Future Appointments  Date Time Provider Val Paige   3/30/2018 11:40 AM DO CATIE [facility]   4/30/2018 4:00 PM DO DREAD [facility]

## 2018-01-29 NOTE — PATIENT INSTRUCTIONS
REFER FOR US OF THYROID/ NECK  IF MASS- OCNSIDER IR FOR BIOPSY;  IF LARGER, CONSDIER REFERRAL TO ENT FOR EVALUATION  DISUCSSED ALL SCENARIOS  FOLLOW UP AFTER US- WE CAN CALL PT WITH RESULTS

## 2018-01-30 ENCOUNTER — HOSPITAL ENCOUNTER (OUTPATIENT)
Dept: RADIOLOGY | Age: 58
Discharge: HOME | End: 2018-01-30
Payer: COMMERCIAL

## 2018-01-30 DIAGNOSIS — R22.1 MASS OF LEFT SIDE OF NECK: ICD-10-CM

## 2018-01-30 PROCEDURE — 76536 US EXAM OF HEAD AND NECK: CPT

## 2018-01-31 ENCOUNTER — TELEPHONE (OUTPATIENT)
Dept: FAMILY MEDICINE CLINIC | Facility: CLINIC | Age: 58
End: 2018-01-31

## 2018-01-31 DIAGNOSIS — R22.1 NECK MASS: Primary | ICD-10-CM

## 2018-01-31 NOTE — TELEPHONE ENCOUNTER
Please call pt- she is anxious for results-  it is a thyroid mass 4 7 cm-  it needs to be biopsied -  pls set up interventional radiology appt for her prior to calling her and let her know when it is  Then I will refer her to dr Fuentes Both after he biopsy- I will put both referrals in - ty   pt is very very anxious

## 2018-01-31 NOTE — TELEPHONE ENCOUNTER
CALLED IR THEY STATED THYROID BIOPSY NEED TO BE SCHEDULED WITH CENTRAL SCHEDULING- WILL CALL CENTRAL SCHEDULING

## 2018-02-05 ENCOUNTER — HOSPITAL ENCOUNTER (OUTPATIENT)
Dept: RADIOLOGY | Facility: HOSPITAL | Age: 58
Discharge: HOME | End: 2018-02-05
Admitting: INTERNAL MEDICINE
Payer: COMMERCIAL

## 2018-02-05 DIAGNOSIS — R22.1 NECK MASS: ICD-10-CM

## 2018-02-05 PROCEDURE — 76942 ECHO GUIDE FOR BIOPSY: CPT

## 2018-02-05 PROCEDURE — 10022 HB FNA W/IMAGE: CPT

## 2018-02-05 PROCEDURE — 88173 CYTOPATH EVAL FNA REPORT: CPT | Performed by: INTERNAL MEDICINE

## 2018-02-05 PROCEDURE — 88173 CYTOPATH EVAL FNA REPORT: CPT | Performed by: PATHOLOGY

## 2018-02-05 PROCEDURE — 88172 CYTP DX EVAL FNA 1ST EA SITE: CPT | Performed by: PATHOLOGY

## 2018-02-05 PROCEDURE — 88172 CYTP DX EVAL FNA 1ST EA SITE: CPT | Performed by: INTERNAL MEDICINE

## 2018-02-05 RX ORDER — LIDOCAINE HYDROCHLORIDE 10 MG/ML
2 INJECTION, SOLUTION INFILTRATION; PERINEURAL ONCE
Status: COMPLETED | OUTPATIENT
Start: 2018-02-05 | End: 2018-02-05

## 2018-02-05 RX ADMIN — LIDOCAINE HYDROCHLORIDE 2 ML: 10 INJECTION, SOLUTION INFILTRATION; PERINEURAL at 11:07

## 2018-02-08 ENCOUNTER — TELEPHONE (OUTPATIENT)
Dept: FAMILY MEDICINE CLINIC | Facility: CLINIC | Age: 58
End: 2018-02-08

## 2018-02-08 DIAGNOSIS — C73 THYROID NEOPLASM MALIGNANT (HCC): Primary | ICD-10-CM

## 2018-02-08 NOTE — TELEPHONE ENCOUNTER
pls call  Dr Maris serrato)_ and get an appt with him for this pt asap - thyroid malignancy- expanding lesiion-  Let me know when the appt is and I am going to call the patient today- ty

## 2018-02-08 NOTE — TELEPHONE ENCOUNTER
Made appt for pt to see Dr Monika Wheeler  Monday 02/12/2018 @ 12 noon  Pt needs to be there 20 min early to fill out paper work

## 2018-02-12 ENCOUNTER — TRANSCRIBE ORDERS (OUTPATIENT)
Dept: ADMINISTRATIVE | Facility: HOSPITAL | Age: 58
End: 2018-02-12

## 2018-02-12 DIAGNOSIS — E04.1 NONTOXIC UNINODULAR GOITER: Primary | ICD-10-CM

## 2018-02-15 ENCOUNTER — HOSPITAL ENCOUNTER (OUTPATIENT)
Dept: ULTRASOUND IMAGING | Facility: HOSPITAL | Age: 58
Discharge: HOME | End: 2018-02-15
Attending: SPECIALIST
Payer: COMMERCIAL

## 2018-02-15 DIAGNOSIS — E04.1 NONTOXIC UNINODULAR GOITER: ICD-10-CM

## 2018-02-15 PROCEDURE — 76536 US EXAM OF HEAD AND NECK: CPT

## 2018-02-16 ENCOUNTER — TRANSCRIBE ORDERS (OUTPATIENT)
Dept: ADMINISTRATIVE | Facility: HOSPITAL | Age: 58
End: 2018-02-16

## 2018-02-16 DIAGNOSIS — R59.9 SWELLING OF LYMPH NODES: Primary | ICD-10-CM

## 2018-02-16 DIAGNOSIS — E04.1 THYROID NODULE: ICD-10-CM

## 2018-02-16 DIAGNOSIS — C73 MALIGNANT NEOPLASM OF THYROID GLAND (HCC): ICD-10-CM

## 2018-02-19 ENCOUNTER — APPOINTMENT (OUTPATIENT)
Dept: LAB | Facility: CLINIC | Age: 58
End: 2018-02-19
Payer: COMMERCIAL

## 2018-02-19 DIAGNOSIS — E04.1 THYROID NODULE: ICD-10-CM

## 2018-02-19 DIAGNOSIS — R59.9 SWELLING OF LYMPH NODES: ICD-10-CM

## 2018-02-19 DIAGNOSIS — C73 MALIGNANT NEOPLASM OF THYROID GLAND (HCC): ICD-10-CM

## 2018-02-19 LAB
ANION GAP SERPL CALCULATED.3IONS-SCNC: 5 MMOL/L (ref 4–13)
BASOPHILS # BLD AUTO: 0.02 THOUSANDS/ΜL (ref 0–0.1)
BASOPHILS NFR BLD AUTO: 0 % (ref 0–1)
BUN SERPL-MCNC: 14 MG/DL (ref 5–25)
CALCIUM SERPL-MCNC: 9.2 MG/DL (ref 8.3–10.1)
CHLORIDE SERPL-SCNC: 106 MMOL/L (ref 100–108)
CO2 SERPL-SCNC: 29 MMOL/L (ref 21–32)
CREAT SERPL-MCNC: 0.84 MG/DL (ref 0.6–1.3)
EOSINOPHIL # BLD AUTO: 0.11 THOUSAND/ΜL (ref 0–0.61)
EOSINOPHIL NFR BLD AUTO: 2 % (ref 0–6)
ERYTHROCYTE [DISTWIDTH] IN BLOOD BY AUTOMATED COUNT: 13.2 % (ref 11.6–15.1)
GFR SERPL CREATININE-BSD FRML MDRD: 77 ML/MIN/1.73SQ M
GLUCOSE P FAST SERPL-MCNC: 84 MG/DL (ref 65–99)
HCT VFR BLD AUTO: 41.1 % (ref 34.8–46.1)
HGB BLD-MCNC: 13.5 G/DL (ref 11.5–15.4)
LYMPHOCYTES # BLD AUTO: 1.78 THOUSANDS/ΜL (ref 0.6–4.47)
LYMPHOCYTES NFR BLD AUTO: 28 % (ref 14–44)
MCH RBC QN AUTO: 29.3 PG (ref 26.8–34.3)
MCHC RBC AUTO-ENTMCNC: 32.8 G/DL (ref 31.4–37.4)
MCV RBC AUTO: 89 FL (ref 82–98)
MONOCYTES # BLD AUTO: 0.45 THOUSAND/ΜL (ref 0.17–1.22)
MONOCYTES NFR BLD AUTO: 7 % (ref 4–12)
NEUTROPHILS # BLD AUTO: 4.06 THOUSANDS/ΜL (ref 1.85–7.62)
NEUTS SEG NFR BLD AUTO: 63 % (ref 43–75)
NRBC BLD AUTO-RTO: 0 /100 WBCS
PLATELET # BLD AUTO: 223 THOUSANDS/UL (ref 149–390)
PMV BLD AUTO: 11.9 FL (ref 8.9–12.7)
POTASSIUM SERPL-SCNC: 4.3 MMOL/L (ref 3.5–5.3)
RBC # BLD AUTO: 4.61 MILLION/UL (ref 3.81–5.12)
SODIUM SERPL-SCNC: 140 MMOL/L (ref 136–145)
WBC # BLD AUTO: 6.43 THOUSAND/UL (ref 4.31–10.16)

## 2018-02-19 PROCEDURE — 36415 COLL VENOUS BLD VENIPUNCTURE: CPT | Performed by: SPECIALIST

## 2018-02-19 PROCEDURE — 85025 COMPLETE CBC W/AUTO DIFF WBC: CPT | Performed by: SPECIALIST

## 2018-02-19 PROCEDURE — 80048 BASIC METABOLIC PNL TOTAL CA: CPT

## 2018-02-20 ENCOUNTER — HOSPITAL ENCOUNTER (OUTPATIENT)
Dept: RADIOLOGY | Facility: HOSPITAL | Age: 58
Discharge: HOME | End: 2018-02-20
Attending: SPECIALIST | Admitting: RADIOLOGY
Payer: COMMERCIAL

## 2018-02-20 DIAGNOSIS — R59.9 SWELLING OF LYMPH NODES: ICD-10-CM

## 2018-02-20 DIAGNOSIS — C73 MALIGNANT NEOPLASM OF THYROID GLAND (HCC): ICD-10-CM

## 2018-02-20 PROCEDURE — 88172 CYTP DX EVAL FNA 1ST EA SITE: CPT | Performed by: PATHOLOGY

## 2018-02-20 PROCEDURE — 10022 HB FNA W/IMAGE: CPT

## 2018-02-20 PROCEDURE — 88173 CYTOPATH EVAL FNA REPORT: CPT | Performed by: SPECIALIST

## 2018-02-20 PROCEDURE — 88173 CYTOPATH EVAL FNA REPORT: CPT | Performed by: PATHOLOGY

## 2018-02-20 PROCEDURE — 76942 ECHO GUIDE FOR BIOPSY: CPT

## 2018-02-20 PROCEDURE — 88172 CYTP DX EVAL FNA 1ST EA SITE: CPT | Performed by: SPECIALIST

## 2018-02-20 PROCEDURE — 84432 ASSAY OF THYROGLOBULIN: CPT | Performed by: SPECIALIST

## 2018-02-20 RX ORDER — LIDOCAINE HYDROCHLORIDE 10 MG/ML
2 INJECTION, SOLUTION INFILTRATION; PERINEURAL ONCE
Status: COMPLETED | OUTPATIENT
Start: 2018-02-20 | End: 2018-02-20

## 2018-02-20 RX ADMIN — LIDOCAINE HYDROCHLORIDE 2 ML: 10 INJECTION, SOLUTION INFILTRATION; PERINEURAL at 09:25

## 2018-02-22 LAB — SCAN RESULT: NORMAL

## 2018-03-06 NOTE — PRE-PROCEDURE INSTRUCTIONS
Pre-Surgery Instructions:   Medication Instructions    ALPRAZolam (XANAX) 0 25 mg tablet Instructed patient per Anesthesia Guidelines   atorvastatin (LIPITOR) 20 mg tablet Instructed patient per Anesthesia Guidelines   Cholecalciferol (VITAMIN D3) 5000 units CAPS Instructed patient per Anesthesia Guidelines   Omeprazole 20 MG TBEC Instructed patient per Anesthesia Guidelines   venlafaxine (EFFEXOR) 37 5 mg tablet Instructed patient per Anesthesia Guidelines      Pre op instructions reviewed- showering instructions reviewed-Pt getting hibiclens

## 2018-03-16 ENCOUNTER — ANESTHESIA EVENT (OUTPATIENT)
Dept: PERIOP | Facility: HOSPITAL | Age: 58
DRG: 626 | End: 2018-03-16
Payer: COMMERCIAL

## 2018-03-16 ENCOUNTER — ANESTHESIA (OUTPATIENT)
Dept: PERIOP | Facility: HOSPITAL | Age: 58
DRG: 626 | End: 2018-03-16
Payer: COMMERCIAL

## 2018-03-16 ENCOUNTER — HOSPITAL ENCOUNTER (INPATIENT)
Facility: HOSPITAL | Age: 58
LOS: 1 days | Discharge: HOME | DRG: 626 | End: 2018-03-17
Attending: SPECIALIST | Admitting: SPECIALIST
Payer: COMMERCIAL

## 2018-03-16 DIAGNOSIS — C73 THYROID CARCINOMA (HCC): Primary | Chronic | ICD-10-CM

## 2018-03-16 DIAGNOSIS — E04.1 THYROID NODULE: ICD-10-CM

## 2018-03-16 PROBLEM — C77.0 SECONDARY MALIGNANCY OF LYMPH NODES OF HEAD, FACE AND NECK (HCC): Status: ACTIVE | Noted: 2018-03-16

## 2018-03-16 PROCEDURE — 88309 TISSUE EXAM BY PATHOLOGIST: CPT | Performed by: PATHOLOGY

## 2018-03-16 PROCEDURE — 07T20ZZ RESECTION OF LEFT NECK LYMPHATIC, OPEN APPROACH: ICD-10-PCS | Performed by: SPECIALIST

## 2018-03-16 PROCEDURE — 88307 TISSUE EXAM BY PATHOLOGIST: CPT | Performed by: PATHOLOGY

## 2018-03-16 PROCEDURE — 0GTH0ZZ RESECTION OF RIGHT THYROID GLAND LOBE, OPEN APPROACH: ICD-10-PCS | Performed by: SPECIALIST

## 2018-03-16 PROCEDURE — 0GTG0ZZ RESECTION OF LEFT THYROID GLAND LOBE, OPEN APPROACH: ICD-10-PCS | Performed by: SPECIALIST

## 2018-03-16 RX ORDER — LIDOCAINE HYDROCHLORIDE 10 MG/ML
INJECTION, SOLUTION INFILTRATION; PERINEURAL AS NEEDED
Status: DISCONTINUED | OUTPATIENT
Start: 2018-03-16 | End: 2018-03-16 | Stop reason: SURG

## 2018-03-16 RX ORDER — SODIUM CHLORIDE 9 MG/ML
INJECTION, SOLUTION INTRAVENOUS CONTINUOUS PRN
Status: DISCONTINUED | OUTPATIENT
Start: 2018-03-16 | End: 2018-03-16 | Stop reason: SURG

## 2018-03-16 RX ORDER — PROPOFOL 10 MG/ML
INJECTION, EMULSION INTRAVENOUS AS NEEDED
Status: DISCONTINUED | OUTPATIENT
Start: 2018-03-16 | End: 2018-03-16 | Stop reason: SURG

## 2018-03-16 RX ORDER — VENLAFAXINE 37.5 MG/1
37.5 TABLET ORAL
Status: DISCONTINUED | OUTPATIENT
Start: 2018-03-16 | End: 2018-03-17 | Stop reason: HOSPADM

## 2018-03-16 RX ORDER — LABETALOL HYDROCHLORIDE 5 MG/ML
INJECTION, SOLUTION INTRAVENOUS AS NEEDED
Status: DISCONTINUED | OUTPATIENT
Start: 2018-03-16 | End: 2018-03-16 | Stop reason: SURG

## 2018-03-16 RX ORDER — FENTANYL CITRATE 50 UG/ML
INJECTION, SOLUTION INTRAMUSCULAR; INTRAVENOUS AS NEEDED
Status: DISCONTINUED | OUTPATIENT
Start: 2018-03-16 | End: 2018-03-16 | Stop reason: SURG

## 2018-03-16 RX ORDER — GLYCOPYRROLATE 0.2 MG/ML
INJECTION INTRAMUSCULAR; INTRAVENOUS AS NEEDED
Status: DISCONTINUED | OUTPATIENT
Start: 2018-03-16 | End: 2018-03-16 | Stop reason: SURG

## 2018-03-16 RX ORDER — ATORVASTATIN CALCIUM 20 MG/1
20 TABLET, FILM COATED ORAL
Status: DISCONTINUED | OUTPATIENT
Start: 2018-03-16 | End: 2018-03-17 | Stop reason: HOSPADM

## 2018-03-16 RX ORDER — B-COMPLEX WITH VITAMIN C
2 TABLET ORAL
Status: DISCONTINUED | OUTPATIENT
Start: 2018-03-16 | End: 2018-03-17 | Stop reason: HOSPADM

## 2018-03-16 RX ORDER — CEFAZOLIN SODIUM 1 G/3ML
INJECTION, POWDER, FOR SOLUTION INTRAMUSCULAR; INTRAVENOUS AS NEEDED
Status: DISCONTINUED | OUTPATIENT
Start: 2018-03-16 | End: 2018-03-16 | Stop reason: SURG

## 2018-03-16 RX ORDER — ACETAMINOPHEN 325 MG/1
650 TABLET ORAL EVERY 6 HOURS PRN
Status: DISCONTINUED | OUTPATIENT
Start: 2018-03-16 | End: 2018-03-17 | Stop reason: HOSPADM

## 2018-03-16 RX ORDER — MORPHINE SULFATE 4 MG/ML
4 INJECTION, SOLUTION INTRAMUSCULAR; INTRAVENOUS EVERY 4 HOURS PRN
Status: DISCONTINUED | OUTPATIENT
Start: 2018-03-16 | End: 2018-03-17 | Stop reason: HOSPADM

## 2018-03-16 RX ORDER — OXYCODONE HYDROCHLORIDE AND ACETAMINOPHEN 5; 325 MG/1; MG/1
2 TABLET ORAL EVERY 6 HOURS PRN
Status: DISCONTINUED | OUTPATIENT
Start: 2018-03-16 | End: 2018-03-17 | Stop reason: HOSPADM

## 2018-03-16 RX ORDER — SODIUM CHLORIDE, SODIUM LACTATE, POTASSIUM CHLORIDE, CALCIUM CHLORIDE 600; 310; 30; 20 MG/100ML; MG/100ML; MG/100ML; MG/100ML
75 INJECTION, SOLUTION INTRAVENOUS CONTINUOUS
Status: DISCONTINUED | OUTPATIENT
Start: 2018-03-16 | End: 2018-03-17 | Stop reason: HOSPADM

## 2018-03-16 RX ORDER — MIDAZOLAM HYDROCHLORIDE 1 MG/ML
INJECTION INTRAMUSCULAR; INTRAVENOUS AS NEEDED
Status: DISCONTINUED | OUTPATIENT
Start: 2018-03-16 | End: 2018-03-16 | Stop reason: SURG

## 2018-03-16 RX ORDER — LEVOTHYROXINE SODIUM 0.15 MG/1
150 TABLET ORAL
Status: DISCONTINUED | OUTPATIENT
Start: 2018-03-17 | End: 2018-03-17 | Stop reason: HOSPADM

## 2018-03-16 RX ORDER — LIDOCAINE HYDROCHLORIDE AND EPINEPHRINE 10; 10 MG/ML; UG/ML
INJECTION, SOLUTION INFILTRATION; PERINEURAL AS NEEDED
Status: DISCONTINUED | OUTPATIENT
Start: 2018-03-16 | End: 2018-03-16 | Stop reason: HOSPADM

## 2018-03-16 RX ORDER — ALPRAZOLAM 0.25 MG/1
0.25 TABLET ORAL 2 TIMES DAILY PRN
Status: DISCONTINUED | OUTPATIENT
Start: 2018-03-16 | End: 2018-03-17 | Stop reason: HOSPADM

## 2018-03-16 RX ORDER — PANTOPRAZOLE SODIUM 20 MG/1
20 TABLET, DELAYED RELEASE ORAL
Status: DISCONTINUED | OUTPATIENT
Start: 2018-03-17 | End: 2018-03-17 | Stop reason: HOSPADM

## 2018-03-16 RX ORDER — ROCURONIUM BROMIDE 10 MG/ML
INJECTION, SOLUTION INTRAVENOUS AS NEEDED
Status: DISCONTINUED | OUTPATIENT
Start: 2018-03-16 | End: 2018-03-16 | Stop reason: SURG

## 2018-03-16 RX ORDER — FENTANYL CITRATE/PF 50 MCG/ML
25 SYRINGE (ML) INJECTION
Status: DISCONTINUED | OUTPATIENT
Start: 2018-03-16 | End: 2018-03-16 | Stop reason: HOSPADM

## 2018-03-16 RX ORDER — METOPROLOL TARTRATE 5 MG/5ML
INJECTION INTRAVENOUS AS NEEDED
Status: DISCONTINUED | OUTPATIENT
Start: 2018-03-16 | End: 2018-03-16 | Stop reason: SURG

## 2018-03-16 RX ORDER — ONDANSETRON 2 MG/ML
4 INJECTION INTRAMUSCULAR; INTRAVENOUS EVERY 6 HOURS PRN
Status: DISCONTINUED | OUTPATIENT
Start: 2018-03-16 | End: 2018-03-17 | Stop reason: HOSPADM

## 2018-03-16 RX ORDER — ONDANSETRON 2 MG/ML
INJECTION INTRAMUSCULAR; INTRAVENOUS AS NEEDED
Status: DISCONTINUED | OUTPATIENT
Start: 2018-03-16 | End: 2018-03-16 | Stop reason: SURG

## 2018-03-16 RX ORDER — SODIUM CHLORIDE, SODIUM LACTATE, POTASSIUM CHLORIDE, CALCIUM CHLORIDE 600; 310; 30; 20 MG/100ML; MG/100ML; MG/100ML; MG/100ML
20 INJECTION, SOLUTION INTRAVENOUS CONTINUOUS
Status: DISCONTINUED | OUTPATIENT
Start: 2018-03-16 | End: 2018-03-16

## 2018-03-16 RX ADMIN — OYSTER SHELL CALCIUM WITH VITAMIN D 2 TABLET: 500; 200 TABLET, FILM COATED ORAL at 16:27

## 2018-03-16 RX ADMIN — ROCURONIUM BROMIDE 10 MG: 10 INJECTION INTRAVENOUS at 09:36

## 2018-03-16 RX ADMIN — MIDAZOLAM HYDROCHLORIDE 2 MG: 1 INJECTION, SOLUTION INTRAMUSCULAR; INTRAVENOUS at 08:56

## 2018-03-16 RX ADMIN — OYSTER SHELL CALCIUM WITH VITAMIN D 2 TABLET: 500; 200 TABLET, FILM COATED ORAL at 13:48

## 2018-03-16 RX ADMIN — PROPOFOL 200 MG: 10 INJECTION, EMULSION INTRAVENOUS at 09:00

## 2018-03-16 RX ADMIN — FENTANYL CITRATE 50 MCG: 50 INJECTION, SOLUTION INTRAMUSCULAR; INTRAVENOUS at 11:22

## 2018-03-16 RX ADMIN — METOPROLOL TARTRATE 1 MG: 1 INJECTION, SOLUTION INTRAVENOUS at 11:14

## 2018-03-16 RX ADMIN — FENTANYL CITRATE 100 MCG: 50 INJECTION, SOLUTION INTRAMUSCULAR; INTRAVENOUS at 09:00

## 2018-03-16 RX ADMIN — ROCURONIUM BROMIDE 10 MG: 10 INJECTION INTRAVENOUS at 10:58

## 2018-03-16 RX ADMIN — LIDOCAINE HYDROCHLORIDE 50 MG: 10 INJECTION, SOLUTION INFILTRATION; PERINEURAL at 09:00

## 2018-03-16 RX ADMIN — ONDANSETRON 4 MG: 2 INJECTION INTRAMUSCULAR; INTRAVENOUS at 13:48

## 2018-03-16 RX ADMIN — ATORVASTATIN CALCIUM 20 MG: 20 TABLET, FILM COATED ORAL at 21:57

## 2018-03-16 RX ADMIN — VENLAFAXINE 37.5 MG: 37.5 TABLET ORAL at 21:57

## 2018-03-16 RX ADMIN — SODIUM CHLORIDE, SODIUM LACTATE, POTASSIUM CHLORIDE, AND CALCIUM CHLORIDE 75 ML/HR: .6; .31; .03; .02 INJECTION, SOLUTION INTRAVENOUS at 13:42

## 2018-03-16 RX ADMIN — OXYCODONE HYDROCHLORIDE AND ACETAMINOPHEN 2 TABLET: 5; 325 TABLET ORAL at 13:48

## 2018-03-16 RX ADMIN — NEOSTIGMINE METHYLSULFATE 3 MG: 1 INJECTION, SOLUTION INTRAMUSCULAR; INTRAVENOUS; SUBCUTANEOUS at 12:00

## 2018-03-16 RX ADMIN — ROCURONIUM BROMIDE 10 MG: 10 INJECTION INTRAVENOUS at 09:27

## 2018-03-16 RX ADMIN — DEXAMETHASONE SODIUM PHOSPHATE 10 MG: 10 INJECTION INTRAMUSCULAR; INTRAVENOUS at 09:15

## 2018-03-16 RX ADMIN — SODIUM CHLORIDE: 0.9 INJECTION, SOLUTION INTRAVENOUS at 09:10

## 2018-03-16 RX ADMIN — METOPROLOL TARTRATE 2 MG: 1 INJECTION, SOLUTION INTRAVENOUS at 10:31

## 2018-03-16 RX ADMIN — ROCURONIUM BROMIDE 30 MG: 10 INJECTION INTRAVENOUS at 09:00

## 2018-03-16 RX ADMIN — GLYCOPYRROLATE 0.4 MG: 0.2 INJECTION, SOLUTION INTRAMUSCULAR; INTRAVENOUS at 12:00

## 2018-03-16 RX ADMIN — ROCURONIUM BROMIDE 10 MG: 10 INJECTION INTRAVENOUS at 10:13

## 2018-03-16 RX ADMIN — FENTANYL CITRATE 50 MCG: 50 INJECTION, SOLUTION INTRAMUSCULAR; INTRAVENOUS at 09:59

## 2018-03-16 RX ADMIN — OXYCODONE HYDROCHLORIDE AND ACETAMINOPHEN 2 TABLET: 5; 325 TABLET ORAL at 19:40

## 2018-03-16 RX ADMIN — ONDANSETRON 4 MG: 2 INJECTION INTRAMUSCULAR; INTRAVENOUS at 11:48

## 2018-03-16 RX ADMIN — SODIUM CHLORIDE: 0.9 INJECTION, SOLUTION INTRAVENOUS at 08:56

## 2018-03-16 RX ADMIN — CEFAZOLIN SODIUM 2000 MG: 1 INJECTION, POWDER, FOR SOLUTION INTRAMUSCULAR; INTRAVENOUS at 09:08

## 2018-03-16 RX ADMIN — FENTANYL CITRATE 50 MCG: 50 INJECTION, SOLUTION INTRAMUSCULAR; INTRAVENOUS at 10:27

## 2018-03-16 RX ADMIN — LABETALOL HYDROCHLORIDE 10 MG: 5 INJECTION, SOLUTION INTRAVENOUS at 11:26

## 2018-03-16 NOTE — POST OP PROGRESS NOTES
Post Op Check Note -Surgery TIERA Rojo Stem 61 y o  female MRN: [id number]  Unit/Bed#: -01 Encounter: [id number]    ASSESSMENT/PLAN:    61year old female post op day #0, s/p total thyroidectomy , left modified radical neck dissection, bilateral central compartment neck dissection    - drain care  - regular diet  - pain control  - probable D/C 3/17 with follow up in office with Dr Noel Martinez on Monday  Will most likely have drain pulled in office  Problem List     * (Principal)Thyroid carcinoma (HCC) (Chronic)    Secondary malignancy of lymph nodes of head, face and neck (HCC)          Subjective/Objective     Subjective: Adrian Villagran is a 61 y o  female who is status post thyroidectomy, left modified radical neck dissection, bilateral central compartment neck dissection  Pt feels well, minimal pain  JON drain x1 serosanguinous output  Objective/Physical Exam:   Blood pressure 139/78, pulse 99, temperature 98 2 °F (36 8 °C), temperature source Oral, resp  rate 16, SpO2 93 %  ,There is no height or weight on file to calculate BMI  General appearance: alert and oriented, in no acute distress  Skin: JON x1 serosanguinous output  No evidence of hematoma, bruising  No bruising to back of neck or fullness         Current Facility-Administered Medications:     acetaminophen (TYLENOL) tablet 650 mg, 650 mg, Oral, Q6H PRN, Johnson Hitchcock MD    ALPRAZolam Bernadette Broom) tablet 0 25 mg, 0 25 mg, Oral, BID PRN, Johnson Hitchcock MD    atorvastatin (LIPITOR) tablet 20 mg, 20 mg, Oral, HS, Johnson Hitchcock MD    calcium carbonate-vitamin D (OSCAL-D) 500 mg-200 units per tablet 2 tablet, 2 tablet, Oral, TID With Meals, Johnson Hitchcock MD, 2 tablet at 03/16/18 1627    [START ON 3/17/2018] enoxaparin (LOVENOX) subcutaneous injection 40 mg, 40 mg, Subcutaneous, Daily, Johnson Hitchcock MD    lactated ringers infusion, 75 mL/hr, Intravenous, Continuous, Johnson Hitchcock MD, Last Rate: 75 mL/hr at 03/16/18 1342, 75 mL/hr at 03/16/18 1342    [START ON 3/17/2018] levothyroxine tablet 150 mcg, 150 mcg, Oral, Early Morning, Federica Quiroz MD    morphine (PF) 4 mg/mL injection 4 mg, 4 mg, Intravenous, Q4H PRN, Federica Quiroz MD    [facility]ron [facility] injection 4 mg, 4 mg, Intravenous, Q6H PRN, Federica Quiroz MD, 4 mg at 03/16/18 1348    oxyCODONE-acetaminophen (PERCOCET) 5-325 mg per tablet 2 tablet, 2 tablet, Oral, Q6H PRN, Federica Quiroz MD, 2 tablet at 03/16/18 1348    [START ON 3/17/2018] pantoprazole (PROTONIX) EC tablet 20 mg, 20 mg, Oral, Early Morning, Federica Quiroz MD    [facility] tablet 37 5 mg, 37 5 mg, Oral, HS, Federica Quiroz MD      Intake/Output Summary (Last 24 hours) at 03/16/18 1848  Last data filed at 03/16/18 1614   Gross per 24 hour   Intake             2060 ml   Output              630 ml   Net             1430 ml       Invasive Devices     Peripheral Intravenous Line            Peripheral IV 03/16/18 Left Hand less than 1 day    Peripheral IV 03/16/18 Right Hand less than 1 day          Drain            Closed/Suction Drain Left Neck Bulb less than 1 day                          VTE Pharmacologic Prophylaxis: Enoxaparin (Lovenox) morning of 3/17/18

## 2018-03-16 NOTE — ANESTHESIA POSTPROCEDURE EVALUATION
Post-Op Assessment Note      CV Status:  Stable    Mental Status:  Lethargic    Hydration Status:  Euvolemic    PONV Controlled:  Controlled    Airway Patency:  Patent    Post Op Vitals Reviewed: Yes          Staff: CRNA           BP   150/72   Temp  97 8   Pulse  74   Resp   16   SpO2   98

## 2018-03-16 NOTE — ANESTHESIA PREPROCEDURE EVALUATION
Review of Systems/Medical History  Patient summary reviewed  Chart reviewed  No history of anesthetic complications     Cardiovascular  Exercise tolerance: good,  Hyperlipidemia,    Pulmonary  Negative pulmonary ROS        GI/Hepatic    GERD well controlled,             Endo/Other  History of thyroid disease ,      GYN  Negative gynecology ROS          Hematology  Negative hematology ROS      Musculoskeletal  Negative musculoskeletal ROS        Neurology  Negative neurology ROS      Psychology   Anxiety, Depression ,              Physical Exam    Airway    Mallampati score: I  TM Distance: >3 FB  Neck ROM: full     Dental   No notable dental hx     Cardiovascular  Cardiovascular exam normal    Pulmonary  Pulmonary exam normal     Other Findings        Anesthesia Plan  ASA Score- 2     Anesthesia Type- general with ASA Monitors  Additional Monitors:   Airway Plan: ETT  Plan Factors-    Induction- intravenous  Postoperative Plan- Plan for postoperative opioid use  Informed Consent- Anesthetic plan and risks discussed with patient and spouse  I personally reviewed this patient with the CRNA  Discussed and agreed on the Anesthesia Plan with the CRNA  Anisa Peñaloza

## 2018-03-16 NOTE — PLAN OF CARE
Knowledge Deficit     Patient/family/caregiver demonstrates understanding of disease process, treatment plan, medications, and discharge instructions Not Progressing        PAIN - ADULT     Verbalizes/displays adequate comfort level or baseline comfort level Not Progressing        Potential for Falls     Patient will remain free of falls Not Progressing        SKIN/TISSUE INTEGRITY - ADULT     Incision(s), wounds(s) or drain site(s) healing without S/S of infection Not Progressing

## 2018-03-16 NOTE — INTERIM OP NOTE
TOTAL THYROIDECTOMY, LEFT MODIFIED RADICAL AND CENTRAL NECK DISSECTION  Postoperative Note  PATIENT NAME: Marin Frias  : 1960  MRN: [id number]  AN OR ROOM 04    Surgery Date: 3/16/2018    Preop Diagnosis:  Thyroid nodule [E04 1]    Post-Op Diagnosis Codes: * Thyroid carcinoma ([address] ) [C73]     * Secondary malignancy of lymph nodes of head, face and neck (HCC) [C77 0]    Procedure(s) (LRB):  TOTAL THYROIDECTOMY  BILATERAL CENTRAL COMPARTMENT NECK DISSECTION  LEFT MODIFIED RADICAL NECK DISSECTION     Surgeon(s) and Role:     * Sergio Daniel MD - Primary     * Jeanne Bryant PA-C - Assisting    Specimens:  ID Type Source Tests Collected by Time Destination   1 : Total thyroid Tissue Thyroid TISSUE Rudi Bees Sergio Daniel MD 3/16/2018 1034    2 : right central compartment Tissue Neck TISSUE EXAM Sergio Daniel MD 3/16/2018 1045    3 : left central compartment Tissue Neck TISSUE EXAM Sergio Daniel MD 3/16/2018 1046    4 : left neck dissection, levels 3 and 4 as marked Tissue Neck TISSUE Saul Espinoza MD 3/16/2018 1140        Estimated Blood Loss:   150 mL    Anesthesia Type:   General     Findings:   Large nodule replacing left lobe, with overlying sternothyroid muscle adherent  Bilateral recurrent laryngeal nerves identified and preserved  Bilateral upper parathyroid glands identified and preserved  No grossly pathologic nodes in central compartment or left neck, one mildly enlarged lymph node left level 3 consistent with pathologic node confirmed by needle aspiration biopsy      Complications:   None    SIGNATURE: Sergio Daniel MD   DATE: 2018   TIME: 12:08 PM

## 2018-03-16 NOTE — OR NURSING
Called and spoke with Anastasiia Ryan on mobile phone to advise we have completed the thyroidectomy and central neck dissection  We are moving to the left side and will take approximately 1 5 hours more but all is going well at this time

## 2018-03-16 NOTE — INTERVAL H&P NOTE
H&P reviewed  After examining the patient I find no changes in the patients condition since the H&P had been written  Since fine needle aspiration biopsy confirmed malignancy in a left jugular chain node we are definitively preceding with left neck dissection in addition to total thyroidectomy

## 2018-03-16 NOTE — PLAN OF CARE
Problem: Potential for Falls  Goal: Patient will remain free of falls  INTERVENTIONS:  - Assess patient frequently for physical needs  -  Identify cognitive and physical deficits and behaviors that affect risk of falls  -  [place]rill fall precautions as indicated by assessment   - Educate patient/family on patient safety including physical limitations  - Instruct patient to call for assistance with activity based on assessment  - Modify environment to reduce risk of injury  - Consider OT/PT consult to assist with strengthening/mobility   Outcome: Progressing      Problem: PAIN - ADULT  Goal: Verbalizes/displays adequate comfort level or baseline comfort level  Interventions:  - Encourage patient to monitor pain and request assistance  - Assess pain using appropriate pain scale  - Administer analgesics based on type and severity of pain and evaluate response  - Implement non-pharmacological measures as appropriate and evaluate response  - Consider cultural and social influences on pain and pain management  - Notify physician/advanced practitioner if interventions unsuccessful or patient reports new pain  Outcome: Progressing      Problem: Knowledge Deficit  Goal: Patient/family/caregiver demonstrates understanding of disease process, treatment plan, medications, and discharge instructions  Complete learning assessment and assess knowledge base    Interventions:  - Provide teaching at level of understanding  - Provide teaching via preferred learning methods  Outcome: Progressing      Problem: SKIN/TISSUE INTEGRITY - ADULT  Goal: Incision(s), wounds(s) or drain site(s) healing without S/S of infection  INTERVENTIONS  - Assess and document risk factors for skin impairment   - Assess and document dressing, incision, wound bed, drain sites and surrounding tissue  - Initiate Nutrition services consult and/or wound management as needed  Outcome: Progressing

## 2018-03-17 VITALS
DIASTOLIC BLOOD PRESSURE: 60 MMHG | SYSTOLIC BLOOD PRESSURE: 124 MMHG | HEART RATE: 100 BPM | OXYGEN SATURATION: 97 % | RESPIRATION RATE: 18 BRPM | TEMPERATURE: 98.8 F

## 2018-03-17 LAB
PLATELET # BLD AUTO: 241 THOUSANDS/UL (ref 149–390)
PMV BLD AUTO: 11.1 FL (ref 8.9–12.7)

## 2018-03-17 PROCEDURE — 85049 AUTOMATED PLATELET COUNT: CPT | Performed by: SPECIALIST

## 2018-03-17 RX ORDER — LEVOTHYROXINE SODIUM 0.15 MG/1
150 TABLET ORAL
Qty: 30 TABLET | Refills: 1 | Status: SHIPPED | OUTPATIENT
Start: 2018-03-18 | End: 2018-03-30 | Stop reason: ALTCHOICE

## 2018-03-17 RX ORDER — B-COMPLEX WITH VITAMIN C
2 TABLET ORAL
Qty: 174 TABLET | Refills: 0 | Status: SHIPPED | OUTPATIENT
Start: 2018-03-17 | End: 2018-09-11 | Stop reason: ALTCHOICE

## 2018-03-17 RX ADMIN — ENOXAPARIN SODIUM 40 MG: 40 INJECTION SUBCUTANEOUS at 08:37

## 2018-03-17 RX ADMIN — PANTOPRAZOLE SODIUM 20 MG: 20 TABLET, DELAYED RELEASE ORAL at 05:47

## 2018-03-17 RX ADMIN — OYSTER SHELL CALCIUM WITH VITAMIN D 2 TABLET: 500; 200 TABLET, FILM COATED ORAL at 11:12

## 2018-03-17 RX ADMIN — LEVOTHYROXINE SODIUM 150 MCG: 150 TABLET ORAL at 05:47

## 2018-03-17 RX ADMIN — OXYCODONE HYDROCHLORIDE AND ACETAMINOPHEN 2 TABLET: 5; 325 TABLET ORAL at 08:40

## 2018-03-17 RX ADMIN — OYSTER SHELL CALCIUM WITH VITAMIN D 2 TABLET: 500; 200 TABLET, FILM COATED ORAL at 08:30

## 2018-03-17 RX ADMIN — OXYCODONE HYDROCHLORIDE AND ACETAMINOPHEN 2 TABLET: 5; 325 TABLET ORAL at 02:23

## 2018-03-17 RX ADMIN — SODIUM CHLORIDE, SODIUM LACTATE, POTASSIUM CHLORIDE, AND CALCIUM CHLORIDE 75 ML/HR: .6; .31; .03; .02 INJECTION, SOLUTION INTRAVENOUS at 03:00

## 2018-03-17 NOTE — DISCHARGE INSTRUCTIONS
Total Thyroidectomy   WHAT YOU NEED TO KNOW:   A total thyroidectomy is surgery to remove your thyroid gland  Your thyroid gland makes hormones that regulate your metabolism, body temperature, heart rate, and the level of calcium in your blood  Your thyroid gland is shaped like a butterfly and found in the front lower part of your neck  DISCHARGE INSTRUCTIONS:   Medicines:   · Thyroid hormone: You are given this medicine to bring your thyroid hormone level back to normal      · Pain medicine: You may be given a prescription medicine to decrease pain  Do not wait until the pain is severe before you take this medicine  · Take your medicine as directed  Contact your healthcare provider if you think your medicine is not helping or if you have side effects  Tell him or her if you are allergic to any medicine  Keep a list of the medicines, vitamins, and herbs you take  Include the amounts, and when and why you take them  Bring the list or the pill bottles to follow-up visits  Carry your medicine list with you in case of an emergency  Follow up with your endocrinologist or surgeon as directed: You may need to return to have your bandage changed, drains removed, or more tests  Write down your questions so you remember to ask them during your visits  Self-care:   · Drains: You may go home with one or more drains in your neck  Ask your surgeon for more information about drains  · Swallowing and voice changes: You may have a sore throat, hoarse voice, or difficulty swallowing after surgery  It is normal to have these problems for up to 6 months after total thyroidectomy surgery  · Supplements:  You need to take calcium and vitamin D as prescribed  Contact your endocrinologist or surgeon if:   · You have a fever  · You have questions about your drain  · You have pain in your surgery area that does not go away after you take pain medicine      · You lose weight, feel very nervous and hungry, and sweat for no reason  · You feel very tired and cold, gain weight for no reason, and your skin is very dry  · You vomit several times in a row  · Your skin is itchy, swollen, or has a rash  · Your incision is swollen, red, or has pus coming from it  · You have new voice weakness or hoarseness, or it is getting worse  · You have questions or concerns about your condition or care  Seek care immediately or call 911 if:   · You have sudden tingling or muscle cramps in your face, arm, or leg  · You have muscle spasms in your legs and feet that do not go away  · You have sudden abdominal pain  · Your arm or leg feels warm, tender, and painful  It may look swollen and red  · Your incision comes apart, or blood soaks through your bandage  · You have sudden swelling in your neck or difficulty swallowing  · You suddenly feel lightheaded and short of breath  · You have chest pain when you take a deep breath or cough, or you cough up blood  © 2017 [facility] Information is for End User's use only and may not be sold, redistributed or otherwise used for commercial purposes  All illustrations and images included in CareNotes® are the copyrighted property of A D A M , Inc  or Remy Shaver  The above information is an  only  It is not intended as medical advice for individual conditions or treatments  Talk to your doctor, nurse or pharmacist before following any medical regimen to see if it is safe and effective for you  Call Dr Azam Mosley with any questions or concerns: office ; mobile  (urgent issues)  Mingo-Sepulveda Drain Care   WHAT YOU NEED TO KNOW:   A Mingo-Sepulveda (JON) drain is used to remove fluids that build up in an area of your body after surgery  The JON drain is a bulb shaped device connected to a tube  One end of the tube is placed inside you during surgery   The other end comes out through a small cut in your skin  The bulb is connected to this end  You may have a stitch to hold the tube in place  DISCHARGE INSTRUCTIONS:   Return to the emergency department if:   · Your JON drain breaks or comes out  · You have cloudy yellow or brown drainage from your JON drain site, or the drainage smells bad  Contact your healthcare provider if:   · You drain less than 30 milliliters (2 tablespoons) in 24 hours  This may mean your drain can be removed  · You suddenly stop draining fluid or think your JON drain is blocked  · You have a fever higher than 101 5°F (38 6°C)  · You have increased pain, redness, or swelling around the drain site  · You have questions about your JON drain care  How a Mingo-Sepulveda drain works: The JON drain removes fluids by creating suction in the tube  The bulb is squeezed flat and connected to the tube that sticks out of your body  The bulb expands as it fills with fluid  How to change the bandage around your Mingo-Sepulveda drain:  If you have a bandage, change it once a day  You may need to change your bandage more than once a day if it gets completely wet  · Wash your hands with soap and water  · Loosen the tape and gently remove the old bandage  Throw the old bandage into a plastic trash bag  · Use soap and water or saline solution to clean your JON drain site  Dip a cotton swab or gauze pad in the solution and gently clean your skin  · Pat the area dry  · Place a new bandage on your JON drain site and secure it to your skin with surgical tape  · Wash your hands  How to empty the Mingo-Sepulveda drain:  Empty the bulb when it is half full or every 8 to 12 hours  · Wash your hands with soap and water  · Remove the plug from the bulb  · Pour the fluid into a measuring cup  · Clean the plug with an alcohol swab or a cotton ball dipped in rubbing alcohol  · Squeeze the bulb flat and put the plug back in   The bulb should stay flat until it starts to fill with fluid again  · Measure the amount of fluid you pour out  Write down how much fluid you empty from the JON drain and the date and time you collected it  · Flush the fluid down the toilet  Wash your hands  Clear clogged tubing:  Use the following steps to clear your Mingo-Sepulveda tubing:  · Hold the tubing between your thumb and first finger at the place closest to your skin  This hand will prevent the tube from being pulled out of your skin  · Use your other thumb and first finger to slide the clog down the tubing toward the bulb  You may have to repeat the sliding until the tubing is unclogged  Mingo-Sepulveda drain removal:  The amount of fluid that you drain will decrease as your wound heals  The JON drain usually is removed when less than 30 milliliters (2 tablespoons) is collected in 24 hours  Ask your healthcare provider when and how your JON drain will be removed  Follow up with your healthcare provider as directed:  Write down your questions so you remember to ask them during your visits  © 2017 [facility] Information is for End User's use only and may not be sold, redistributed or otherwise used for commercial purposes  All illustrations and images included in CareNotes® are the copyrighted property of A D A M , Inc  or [employer]  The above information is an  only  It is not intended as medical advice for individual conditions or treatments  Talk to your doctor, nurse or pharmacist before following any medical regimen to see if it is safe and effective for you  Call Dr Angélica Jovel with any questions or concerns: office ; mobile  (urgent issues)

## 2018-03-17 NOTE — PROGRESS NOTES
ENT Progress Note  Bren Monroy Stem 61 y o  female MRN: [id number]  Unit/Bed#: -01 Encounter: [id number]    ASSESSMENT/PLAN:    61year old female post op day #0, s/p total thyroidectomy , left modified radical neck dissection, bilateral central compartment neck dissection    - drain care  - regular diet  - pain control  - D/C today with follow up in office with Dr Azam Mosley on Monday  Will most likely have drain pulled in office  Problem List     * (Principal)Thyroid carcinoma (HCC) (Chronic)    Secondary malignancy of lymph nodes of head, face and neck (HCC)          Subjective/Objective     Subjective: Otf García is a 61 y o  female who is status post thyroidectomy, left modified radical neck dissection, bilateral central compartment neck dissection yesterday    Pt feels well, minimal pain  JON drain x1 serosanguinous output  shirley diet, pain controlled    Objective/Physical Exam:   Blood pressure 124/60, pulse 100, temperature 98 8 °F (37 1 °C), temperature source Oral, resp  rate 18, SpO2 97 %  ,There is no height or weight on file to calculate BMI  General appearance: alert and oriented, in no acute distress  Skin: JON x1 serosanguinous output  No evidence of hematoma, bruising  No bruising to back of neck or fullness         Current Facility-Administered Medications:     acetaminophen (TYLENOL) tablet 650 mg, 650 mg, Oral, Q6H PRN, Damian Matos MD    ALPRAZolam Iris Newberry) tablet 0 25 mg, 0 25 mg, Oral, BID PRN, Damian Matos MD    atorvastatin (LIPITOR) tablet 20 mg, 20 mg, Oral, HS, Damian Matos MD, 20 mg at 03/16/18 2157    calcium carbonate-vitamin D (OSCAL-D) 500 mg-200 units per tablet 2 tablet, 2 tablet, Oral, TID With Meals, Damian Matos MD, 2 tablet at 03/17/18 0830    enoxaparin (LOVENOX) subcutaneous injection 40 mg, 40 mg, Subcutaneous, Daily, Damian Matos MD, 40 mg at 03/17/18 1519    lactated ringers infusion, 75 mL/hr, Intravenous, Continuous, Damian Matos MD, Last Rate: 75 mL/hr at 03/17/18 0300, 75 mL/hr at 03/17/18 0300    levothyroxine tablet 150 mcg, 150 mcg, Oral, Early Morning, Jarrod Norris MD, 150 mcg at 03/17/18 0547    morphine (PF) 4 mg/mL injection 4 mg, 4 mg, Intravenous, Q4H PRN, Jarrod Norris MD    ondansetron [facility]) injection 4 mg, 4 mg, Intravenous, Q6H PRN, Jarrod Norris MD, 4 mg at 03/16/18 1348    oxyCODONE-acetaminophen (PERCOCET) 5-325 mg per tablet 2 tablet, 2 tablet, Oral, Q6H PRN, Jarrod Norris MD, 2 tablet at 03/17/18 0840    pantoprazole (PROTONIX) EC tablet 20 mg, 20 mg, Oral, Early Morning, Jarrod Norris MD, 20 mg at 03/17/18 0547    venlafaxine [facility]) tablet 37 5 mg, 37 5 mg, Oral, HS, Jarrod Norris MD, 37 5 mg at 03/16/18 2157      Intake/Output Summary (Last 24 hours) at 03/17/18 0924  Last data filed at 03/17/18 0555   Gross per 24 hour   Intake           3057 5 ml   Output             3175 ml   Net           -117 5 ml       Invasive Devices     Peripheral Intravenous Line            Peripheral IV 03/16/18 Right Hand 1 day          Drain            Closed/Suction Drain Left Neck Bulb less than 1 day                          VTE Pharmacologic Prophylaxis: Enoxaparin (Lovenox) morning of 3/17/18

## 2018-03-17 NOTE — DISCHARGE SUMMARY
Discharge Summary - Sam Frias 61 y o  female MRN: [id number]    Unit/Bed#: -01 Encounter: [id number]    Admission Date:   Admission Orders     Ordered        03/16/18 1218  Inpatient Admission  Once             Discharge date: 03/17/18    Admitting Diagnosis: Thyroid nodule [E04 1]    HPI: 60 yo F with thyroid cancer s/p total thyroidectomy and L MRND and central neck dissection on 3/16    Procedures Performed: TOTAL THYROIDECTOMY, LEFT MODIFIED RADICAL AND CENTRAL [address] Course: Pt underwent above surgery on 3/16  Was seen 3/17 and deemed stable for discharge  She will go home with synthroid, Ca tabs, pain medication, and will follow up in the office on Monday 3/19 for drain removal      Significant Findings, Care, Treatment and Services Provided: as above    Complications: none    Discharge Diagnosis: thyroid cancer        Condition at Discharge: good     Discharge instructions/Information to patient and family:   See after visit summary for information provided to patient and family  Provisions for Follow-Up Care:  See after visit summary for information related to follow-up care and any pertinent home health orders  Disposition: Home    Planned Readmission: No    Discharge Statement   I spent 20 minutes discharging the patient  This time was spent on the day of discharge  I had direct contact with the patient on the day of discharge  Additional documentation is required if more than 30 minutes were spent on discharge  Discharge Medications:  See after visit summary for reconciled discharge medications provided to patient and family

## 2018-03-19 NOTE — CASE MANAGEMENT
Thank you,  [address] [facility] in the [facility] by Remy Shaver for 2017  Network Utilization Review Department  Phone: [phone]; Fax [phone]  ATTENTION: The Network Utilization Review Department is now centralized for our 7 Facilities  Make a note that we have a new phone and fax numbers for our Department  Please call with any questions or concerns to [phone] and carefully follow the prompts so that you are directed to the right person  All voicemails are confidential  Fax any determinations, approvals, denials, and requests for initial or continue stay review clinical to [phone]  Due to HIGH CALL volume, it would be easier if you could please send faxed requests to expedite your requests and in part, help us provide discharge notifications faster  Initial Clinical Review     ELECTIVE INPATIENT SURGERY, APPROVED IP Faustino Luis Daniel [id number]    Age/Sex: 61 y o  female    Surgery Date: 3/16/18    Procedure: TOTAL THYROIDECTOMY  BILATERAL CENTRAL COMPARTMENT NECK DISSECTION  LEFT MODIFIED RADICAL NECK DISSECTION   Findings:   Large nodule replacing left lobe, with overlying sternothyroid muscle adherent  Bilateral recurrent laryngeal nerves identified and preserved  Bilateral upper parathyroid glands identified and preserved  No grossly pathologic nodes in central compartment or left neck, one mildly enlarged lymph node left level 3 consistent with pathologic node confirmed by needle aspiration biopsy      Anesthesia: general    Admission Orders: Date/Time/Statement: INPATIENT 3/16/18 @ 1218     Orders Placed This Encounter   Procedures    Inpatient Admission     Standing Status:   Standing     Number of Occurrences:   1     Order Specific Question:   Admitting Physician     Answer:   Waynette Primrose [[id number]]     Order Specific Question:   Level of Care     Answer:   Med Surg [[id number]]     Order Specific Question:   Estimated length of stay     Answer:   Inpatient Only Surgery       Vital Signs: /60 (BP Location: Right arm)   Pulse 100   Temp 98 8 °F (37 1 °C) (Oral)   Resp 18   SpO2 97%     Diet: hse diet    Mobility: up as tolerated, no strenuous exercise    DVT Prophylaxis: SCD's, SQ lovenox daily    Pain Control:   PO percocet prn  PO tylenol prn

## 2018-03-23 ENCOUNTER — APPOINTMENT (OUTPATIENT)
Dept: LAB | Facility: CLINIC | Age: 58
End: 2018-03-23
Payer: COMMERCIAL

## 2018-03-23 ENCOUNTER — TRANSCRIBE ORDERS (OUTPATIENT)
Dept: LAB | Facility: CLINIC | Age: 58
End: 2018-03-23

## 2018-03-23 DIAGNOSIS — C73 MALIGNANT NEOPLASM OF THYROID GLAND (HCC): ICD-10-CM

## 2018-03-23 DIAGNOSIS — E89.2 STATUS POST PARATHYROIDECTOMY (HCC): Primary | ICD-10-CM

## 2018-03-23 DIAGNOSIS — E55.9 AVITAMINOSIS D: ICD-10-CM

## 2018-03-23 DIAGNOSIS — E89.2 STATUS POST PARATHYROIDECTOMY (HCC): ICD-10-CM

## 2018-03-23 LAB
25(OH)D3 SERPL-MCNC: 33.3 NG/ML (ref 30–100)
ALBUMIN SERPL BCP-MCNC: 4 G/DL (ref 3.5–5)
ALP SERPL-CCNC: 226 U/L (ref 46–116)
ALT SERPL W P-5'-P-CCNC: 113 U/L (ref 12–78)
ANION GAP SERPL CALCULATED.3IONS-SCNC: 10 MMOL/L (ref 4–13)
AST SERPL W P-5'-P-CCNC: 63 U/L (ref 5–45)
BILIRUB SERPL-MCNC: 0.5 MG/DL (ref 0.2–1)
BUN SERPL-MCNC: 13 MG/DL (ref 5–25)
CALCIUM SERPL-MCNC: 8.4 MG/DL (ref 8.3–10.1)
CHLORIDE SERPL-SCNC: 99 MMOL/L (ref 100–108)
CO2 SERPL-SCNC: 31 MMOL/L (ref 21–32)
CREAT SERPL-MCNC: 1.22 MG/DL (ref 0.6–1.3)
GFR SERPL CREATININE-BSD FRML MDRD: 49 ML/MIN/1.73SQ M
GLUCOSE SERPL-MCNC: 87 MG/DL (ref 65–140)
MAGNESIUM SERPL-MCNC: 2.1 MG/DL (ref 1.6–2.6)
PHOSPHATE SERPL-MCNC: 5.7 MG/DL (ref 2.7–4.5)
POTASSIUM SERPL-SCNC: 3.9 MMOL/L (ref 3.5–5.3)
PROT SERPL-MCNC: 8.3 G/DL (ref 6.4–8.2)
PTH-INTACT SERPL-MCNC: 7.1 PG/ML (ref 18.4–80.1)
SODIUM SERPL-SCNC: 140 MMOL/L (ref 136–145)
T3FREE SERPL-MCNC: 1.86 PG/ML (ref 2.3–4.2)
T4 FREE SERPL-MCNC: 0.91 NG/DL (ref 0.76–1.46)
TSH SERPL DL<=0.05 MIU/L-ACNC: 10.87 UIU/ML (ref 0.36–3.74)

## 2018-03-23 PROCEDURE — 80053 COMPREHEN METABOLIC PANEL: CPT

## 2018-03-23 PROCEDURE — 83735 ASSAY OF MAGNESIUM: CPT

## 2018-03-23 PROCEDURE — 84432 ASSAY OF THYROGLOBULIN: CPT

## 2018-03-23 PROCEDURE — 84443 ASSAY THYROID STIM HORMONE: CPT

## 2018-03-23 PROCEDURE — 36415 COLL VENOUS BLD VENIPUNCTURE: CPT

## 2018-03-23 PROCEDURE — 82306 VITAMIN D 25 HYDROXY: CPT

## 2018-03-23 PROCEDURE — 84439 ASSAY OF FREE THYROXINE: CPT

## 2018-03-23 PROCEDURE — 83970 ASSAY OF PARATHORMONE: CPT

## 2018-03-23 PROCEDURE — 86800 THYROGLOBULIN ANTIBODY: CPT

## 2018-03-23 PROCEDURE — 84100 ASSAY OF PHOSPHORUS: CPT

## 2018-03-23 PROCEDURE — 84481 FREE ASSAY (FT-3): CPT

## 2018-03-26 ENCOUNTER — TRANSITIONAL CARE MANAGEMENT (OUTPATIENT)
Dept: FAMILY MEDICINE CLINIC | Facility: CLINIC | Age: 58
End: 2018-03-26

## 2018-03-27 ENCOUNTER — HOSPITAL ENCOUNTER (OUTPATIENT)
Dept: RADIOLOGY | Age: 58
Discharge: HOME | End: 2018-03-27

## 2018-03-27 DIAGNOSIS — C73 MALIGNANT NEOPLASM OF THYROID GLAND (HCC): ICD-10-CM

## 2018-03-28 LAB
THYROGLOB AB SERPL-ACNC: 4.4 IU/ML (ref 0–0.9)
THYROGLOB SERPL-MCNC: 4.8 NG/ML

## 2018-03-30 ENCOUNTER — ANNUAL EXAM (OUTPATIENT)
Dept: OBGYN CLINIC | Facility: MEDICAL CENTER | Age: 58
End: 2018-03-30
Payer: COMMERCIAL

## 2018-03-30 VITALS
DIASTOLIC BLOOD PRESSURE: 70 MMHG | BODY MASS INDEX: 33.18 KG/M2 | HEIGHT: 67 IN | WEIGHT: 211.4 LBS | SYSTOLIC BLOOD PRESSURE: 114 MMHG

## 2018-03-30 DIAGNOSIS — Z12.31 ENCOUNTER FOR SCREENING MAMMOGRAM FOR MALIGNANT NEOPLASM OF BREAST: ICD-10-CM

## 2018-03-30 DIAGNOSIS — C77.0 SECONDARY MALIGNANCY OF LYMPH NODES OF HEAD, FACE AND NECK (HCC): ICD-10-CM

## 2018-03-30 DIAGNOSIS — Z01.419 ENCOUNTER FOR GYNECOLOGICAL EXAMINATION WITHOUT ABNORMAL FINDING: Primary | ICD-10-CM

## 2018-03-30 DIAGNOSIS — C73 THYROID CARCINOMA (HCC): Chronic | ICD-10-CM

## 2018-03-30 PROCEDURE — S0612 ANNUAL GYNECOLOGICAL EXAMINA: HCPCS | Performed by: OBSTETRICS & GYNECOLOGY

## 2018-03-30 RX ORDER — LIOTHYRONINE SODIUM 25 UG/1
TABLET ORAL
Refills: 0 | COMMUNITY
Start: 2018-03-26 | End: 2018-04-02 | Stop reason: ALTCHOICE

## 2018-03-30 RX ORDER — CALCITRIOL 0.5 UG/1
0.5 CAPSULE, LIQUID FILLED ORAL 2 TIMES DAILY
Refills: 0 | COMMUNITY
Start: 2018-03-20 | End: 2018-05-30 | Stop reason: ALTCHOICE

## 2018-03-30 RX ORDER — CALCIUM CARBONATE/VITAMIN D3 500-3.125
TABLET ORAL
Refills: 0 | COMMUNITY
Start: 2018-03-17 | End: 2018-05-30 | Stop reason: ALTCHOICE

## 2018-03-30 RX ORDER — LIOTHYRONINE SODIUM 5 UG/1
TABLET ORAL
Refills: 0 | COMMUNITY
Start: 2018-03-26 | End: 2018-04-02 | Stop reason: ALTCHOICE

## 2018-03-30 NOTE — PROGRESS NOTES
Assessment/Plan:    No problem-specific Assessment & Plan notes found for this encounter  Diagnoses and all orders for this visit:    Encounter for gynecological examination without abnormal finding    Encounter for screening mammogram for malignant neoplasm of breast  -     Mammo screening bilateral w 3d & cad; Future    Thyroid carcinoma ([facility] [address] )    Secondary malignancy of lymph nodes of head, face and neck ([facility] [address] )    Other orders  -     calcitriol (ROCALTROL) 0 5 MCG capsule; Take 0 5 mcg by mouth 2 (two) times a day  -     OS-TABITHA CALCIUM + D3 500-200 MG-UNIT TABS; TAKE 2 TABLETS BY MOUTH 3 (THREE) TIMES A DAY WITH MEALS FOR 87 DOSES  -     liothyronine (CYTOMEL) 25 mcg tablet; TAKE ONE 25 MCG TAB DAILY IN AM AND TWO 5 MCG TABS (10 MCG) IN PM   -     liothyronine (CYTOMEL) 5 mcg tablet; TAKE ONE 25 MCG TAB IN AM AND TWO 5 MCG TABS (10 MCG) IN PM        Annual examination was completed  Mammogram was ordered  We discussed the constellation symptoms that would be expected following a major surgical procedure as well as in concert with her diagnosis of thyroid carcinoma  Patient will return to the office in 1 year or as necessary  Subjective:      Patient ID: Leticia Arias is a 61 y o  female  Patient returns for annual gyn visit  She was recently diagnosed with metastatic thyroid carcinoma  She is recovering from surgery 2 weeks ago  She is quite emotional at today's visit as she has had some significant fatigue and worries about her prognosis  She has no gynecologic complaints  She denies vaginal bleeding  Gynecologic Exam         The following portions of the patient's history were reviewed and updated as appropriate:   She  has a past medical history of Anxiety; Depression; Disease of thyroid gland; GERD (gastroesophageal reflux disease); and Hyperlipidemia    She   Patient Active Problem List    Diagnosis Date Noted    Encounter for gynecological examination without abnormal finding 03/30/2018    Encounter for screening mammogram for malignant neoplasm of breast 03/30/2018    Thyroid carcinoma ([facility] [address] ) 03/16/2018    Secondary malignancy of lymph nodes of head, face and neck ([facility] [address] ) 03/16/2018     She  has a past surgical history that includes Tubal ligation; Colonoscopy; pr thyroidectomy w rad neck surgery (N/A, 3/16/2018); and Radical neck dissection (N/A, 3/16/2018)  Her family history includes Diabetes in her paternal uncle  She  reports that she quit smoking about 38 years ago  She has never used smokeless tobacco  She reports that she drinks alcohol  She reports that she does not use drugs  Current Outpatient Prescriptions   Medication Sig Dispense Refill    ALPRAZolam (XANAX) 0 25 mg tablet Take 0 25 mg by mouth 2 (two) times a day as needed        atorvastatin (LIPITOR) 20 mg tablet Take 20 mg by mouth daily at bedtime        calcitriol (ROCALTROL) 0 5 MCG capsule Take 0 5 mcg by mouth 2 (two) times a day  0    calcium carbonate-vitamin D (OSCAL-D) 500 mg-200 units per tablet Take 2 tablets by mouth 3 (three) times a day with meals for 87 doses 174 tablet 0    Cholecalciferol (VITAMIN D3) 5000 units CAPS Take 1 capsule by mouth daily at bedtime        liothyronine (CYTOMEL) 25 mcg tablet TAKE ONE 25 MCG TAB DAILY IN AM AND TWO 5 MCG TABS (10 MCG) IN PM   0    liothyronine (CYTOMEL) 5 mcg tablet TAKE ONE 25 MCG TAB IN AM AND TWO 5 MCG TABS (10 MCG) IN PM  0    Omeprazole 20 MG TBEC Take 1 capsule by mouth daily at bedtime        OS-TABITHA CALCIUM + D3 500-200 MG-UNIT TABS TAKE 2 TABLETS BY MOUTH 3 (THREE) TIMES A DAY WITH MEALS FOR 87 DOSES  0    venlafaxine (EFFEXOR) 37 5 mg tablet Take 37 5 mg by mouth daily at bedtime         No current facility-administered medications for this visit        Current Outpatient Prescriptions on File Prior to Visit   Medication Sig    ALPRAZolam (XANAX) 0 25 mg tablet Take 0 25 mg by mouth 2 (two) times a day as needed      atorvastatin (LIPITOR) 20 mg tablet Take 20 mg by mouth daily at bedtime      calcium carbonate-vitamin D (OSCAL-D) 500 mg-200 units per tablet Take 2 tablets by mouth 3 (three) times a day with meals for 87 doses    Cholecalciferol (VITAMIN D3) 5000 units CAPS Take 1 capsule by mouth daily at bedtime      Omeprazole 20 MG TBEC Take 1 capsule by mouth daily at bedtime      venlafaxine (EFFEXOR) 37 5 mg tablet Take 37 5 mg by mouth daily at bedtime      [DISCONTINUED] levothyroxine 150 mcg tablet Take 1 tablet (150 mcg total) by mouth daily in the early morning     No current facility-administered medications on file prior to visit  She has No Known Allergies       Review of Systems   All other systems reviewed and are negative  Objective:      /70 (BP Location: Left arm, Patient Position: Sitting, Cuff Size: Large)   Ht 5' 7" (1 702 m)   Wt 95 9 kg (211 lb 6 4 oz)   BMI 33 11 kg/m²          Physical Exam   Constitutional: She is oriented to person, place, and time  She appears well-developed and well-nourished  HENT:   Head: Normocephalic and atraumatic  Nose: Nose normal    Healing surgical wound anterior neck; no evidence of infection or mass   Eyes: EOM are normal  Pupils are equal, round, and reactive to light  Neck: Normal range of motion  Neck supple  No thyromegaly present  Cardiovascular: Normal rate and regular rhythm  Pulmonary/Chest: Effort normal and breath sounds normal  No respiratory distress  Breasts symmetrical without masses, skin changes or adenopathy   Abdominal: Soft  Bowel sounds are normal  She exhibits no distension and no mass  There is no tenderness  Hernia confirmed negative in the right inguinal area and confirmed negative in the left inguinal area  Genitourinary: Vagina normal and uterus normal  No breast swelling, tenderness, discharge or bleeding  Pelvic exam was performed with patient supine  No labial fusion   There is no rash, tenderness, lesion or injury on the right labia  There is no rash, tenderness, lesion or injury on the left labia  Cervix exhibits no motion tenderness, no discharge and no friability  Genitourinary Comments: Ext gen nl w/o lesions  Vag healthy w/o lesions or discharge  Cervix healthy w/o lesions  Uterus nl size, NT  Adnexa NT no masses  Rectal no masses   Musculoskeletal: Normal range of motion  She exhibits no edema  Lymphadenopathy:        Right: No inguinal adenopathy present  Left: No inguinal adenopathy present  Neurological: She is alert and oriented to person, place, and time  She has normal reflexes  Skin: Skin is warm and dry  No rash noted  Psychiatric: She has a normal mood and affect  Her behavior is normal  Thought content normal    Nursing note and vitals reviewed

## 2018-04-02 ENCOUNTER — OFFICE VISIT (OUTPATIENT)
Dept: FAMILY MEDICINE CLINIC | Facility: CLINIC | Age: 58
End: 2018-04-02
Payer: COMMERCIAL

## 2018-04-02 VITALS
HEIGHT: 68 IN | TEMPERATURE: 98.6 F | SYSTOLIC BLOOD PRESSURE: 108 MMHG | BODY MASS INDEX: 32.61 KG/M2 | WEIGHT: 215.2 LBS | HEART RATE: 118 BPM | DIASTOLIC BLOOD PRESSURE: 78 MMHG

## 2018-04-02 DIAGNOSIS — C73 PAPILLARY THYROID CARCINOMA (HCC): ICD-10-CM

## 2018-04-02 DIAGNOSIS — K21.9 CHRONIC GERD: ICD-10-CM

## 2018-04-02 DIAGNOSIS — F41.1 GAD (GENERALIZED ANXIETY DISORDER): ICD-10-CM

## 2018-04-02 DIAGNOSIS — IMO0001 TRANSITION OF CARE PERFORMED WITH SHARING OF CLINICAL SUMMARY: Primary | ICD-10-CM

## 2018-04-02 PROCEDURE — 99496 TRANSJ CARE MGMT HIGH F2F 7D: CPT | Performed by: INTERNAL MEDICINE

## 2018-04-02 RX ORDER — ALPRAZOLAM 0.25 MG/1
0.25 TABLET ORAL 2 TIMES DAILY PRN
Qty: 180 TABLET | Refills: 0 | Status: SHIPPED | OUTPATIENT
Start: 2018-04-02 | End: 2018-08-16 | Stop reason: SDUPTHER

## 2018-04-02 RX ORDER — ALPRAZOLAM 0.25 MG/1
0.25 TABLET ORAL 2 TIMES DAILY PRN
Qty: 60 TABLET | Refills: 0 | Status: SHIPPED | OUTPATIENT
Start: 2018-04-02 | End: 2018-04-02 | Stop reason: SDUPTHER

## 2018-04-02 RX ORDER — FAMOTIDINE 40 MG/1
40 TABLET, FILM COATED ORAL DAILY
Qty: 30 TABLET | Refills: 3 | Status: SHIPPED | OUTPATIENT
Start: 2018-04-02

## 2018-04-02 NOTE — PROGRESS NOTES
ASSESSMENT and PLAN:  Antoine Caban is a 61 y o  female with:   Problem List Items Addressed This Visit     Papillary thyroid carcinoma ([address] )     Folllow up with endocrine  Continue on calcium         Chronic GERD    Relevant Medications    famotidine (PEPCID) 40 MG tablet    SAVANA (generalized anxiety disorder)    Relevant Medications    ALPRAZolam (XANAX) 0 25 mg tablet    Transition of care performed with sharing of clinical summary - Primary        Date and time hospital follow up call was made:  3/26/2018  4:50 PM  Hospital care reviewed:  Records reviewed  Patient was [facility]italized at:  [facility]  Date of admission:  3/16/18  Date of discharge:  3/17/18  Diagnosis:  Thyroid Nodule/Thyroidectomy  Current symptoms:  None  Post hospital issues:  None  Should patient be enrolled in anticoag monitoring?:  No  Scheduled for follow up?:  Yes  Referrals needed:  Otolaryngologist  Did you obtain your prescribed medications:  Yes  Do you need help managing your perscriptions or medications:  No  Is transportation to your appointments needed:  No  I have advised the patient to call PCP with any new or worsening symptoms (please type in name along with any credentials):  Brenton Quesada MA  Are you recieving outpatient services:  No  Are you recieving home care services:  No  Are you using any community resources:  No  Current waiver service:  No  Counseling:  Patient  Comments:  Hospital f/up scheduled for 4/2/18 @ 2:30       SUBJECTIVE:  Antoine Caban is a 61 y o  female who presents today with a chief complaint of Transition of Care Management (d/c [facility] evelin/ thyroidectomy)    Patient is s/p total thyroidectomy with 9 positive lymph nodes after radical neck dissection; she is following with endocrine after 2 parathyroid glands removed-  Has appt for radioactive iodine in the near future-   She has been belching a lot- has been taken off of omeprazole due to low calcium level     Pt is back to work- havign a hard time due to fatigue; She is asking for fmla papers to be filled out for intermittent leave  Review of Systems   Constitutional: Positive for fatigue  HENT: Negative  Negative for postnasal drip  Eyes: Negative  Respiratory: Negative  Cardiovascular: Negative  Gastrointestinal: Negative  Endocrine: Negative  Genitourinary: Negative  Musculoskeletal: Negative  Skin: Positive for wound (healing surgical wound left side of neck )  Allergic/Immunologic: Negative  Neurological: Negative  Hematological: Negative  Psychiatric/Behavioral: Negative  I have reviewed the patient's PMH, Social History, Medication List and Allergies  OBJECTIVE:  /78 (BP Location: Left arm, Patient Position: Sitting, Cuff Size: Large)   Pulse (!) 118   Temp 98 6 °F (37 °C)   Ht 5' 8" (1 727 m)   Wt 97 6 kg (215 lb 3 2 oz)   Breastfeeding? No   BMI 32 72 kg/m²   Physical Exam   Constitutional: She is oriented to person, place, and time  She appears well-developed and well-nourished  No distress  HENT:   Head: Normocephalic and atraumatic  Right Ear: External ear normal    Left Ear: External ear normal    Nose: Nose normal    Mouth/Throat: Oropharynx is clear and moist    Eyes: Conjunctivae and EOM are normal  Pupils are equal, round, and reactive to light  Neck: Normal range of motion  Neck supple  No JVD present  No tracheal deviation present  No thyromegaly present  Cardiovascular: Normal rate, regular rhythm, normal heart sounds and intact distal pulses  Exam reveals no friction rub  No murmur heard  Pulmonary/Chest: Effort normal and breath sounds normal  No stridor  Abdominal: Soft  Bowel sounds are normal  She exhibits no distension  There is no tenderness  There is no rebound  Musculoskeletal: Normal range of motion  Lymphadenopathy:     She has no cervical adenopathy  Neurological: She is alert and oriented to person, place, and time  Skin: Skin is warm and dry  She is not diaphoretic  Healing wound on neck;    Nursing note and vitals reviewed

## 2018-04-10 ENCOUNTER — APPOINTMENT (OUTPATIENT)
Dept: LAB | Facility: CLINIC | Age: 58
End: 2018-04-10
Payer: COMMERCIAL

## 2018-04-10 DIAGNOSIS — E89.2 STATUS POST PARATHYROIDECTOMY (HCC): ICD-10-CM

## 2018-04-10 DIAGNOSIS — E55.9 AVITAMINOSIS D: ICD-10-CM

## 2018-04-10 DIAGNOSIS — C73 MALIGNANT NEOPLASM OF THYROID GLAND (HCC): ICD-10-CM

## 2018-04-10 LAB
ALBUMIN SERPL BCP-MCNC: 4 G/DL (ref 3.5–5)
ANION GAP SERPL CALCULATED.3IONS-SCNC: 9 MMOL/L (ref 4–13)
BUN SERPL-MCNC: 17 MG/DL (ref 5–25)
CALCIUM ALBUM COR SERPL-MCNC: 10.2 MG/DL (ref 8.3–10.1)
CALCIUM SERPL-MCNC: 10.2 MG/DL
CALCIUM SERPL-MCNC: 10.2 MG/DL (ref 8.3–10.1)
CHLORIDE SERPL-SCNC: 102 MMOL/L (ref 100–108)
CO2 SERPL-SCNC: 31 MMOL/L (ref 21–32)
CREAT SERPL-MCNC: 1.41 MG/DL (ref 0.6–1.3)
GFR SERPL CREATININE-BSD FRML MDRD: 41 ML/MIN/1.73SQ M
GLUCOSE SERPL-MCNC: 82 MG/DL (ref 65–140)
POTASSIUM SERPL-SCNC: 4.2 MMOL/L (ref 3.5–5.3)
SODIUM SERPL-SCNC: 142 MMOL/L (ref 136–145)

## 2018-04-10 PROCEDURE — 36415 COLL VENOUS BLD VENIPUNCTURE: CPT

## 2018-04-10 PROCEDURE — 80048 BASIC METABOLIC PNL TOTAL CA: CPT

## 2018-04-10 PROCEDURE — 82040 ASSAY OF SERUM ALBUMIN: CPT

## 2018-04-16 ENCOUNTER — APPOINTMENT (OUTPATIENT)
Dept: LAB | Facility: CLINIC | Age: 58
End: 2018-04-16
Payer: COMMERCIAL

## 2018-04-16 ENCOUNTER — TRANSCRIBE ORDERS (OUTPATIENT)
Dept: LAB | Facility: CLINIC | Age: 58
End: 2018-04-16

## 2018-04-16 DIAGNOSIS — C73 MALIGNANT NEOPLASM OF THYROID GLAND (HCC): ICD-10-CM

## 2018-04-16 DIAGNOSIS — E89.2 STATUS POST PARATHYROIDECTOMY (HCC): ICD-10-CM

## 2018-04-16 DIAGNOSIS — C73 MALIGNANT NEOPLASM OF THYROID GLAND (HCC): Primary | ICD-10-CM

## 2018-04-16 LAB
ANION GAP SERPL CALCULATED.3IONS-SCNC: 8 MMOL/L (ref 4–13)
BUN SERPL-MCNC: 22 MG/DL (ref 5–25)
CALCIUM SERPL-MCNC: 9.8 MG/DL
CHLORIDE SERPL-SCNC: 99 MMOL/L (ref 100–108)
CO2 SERPL-SCNC: 29 MMOL/L (ref 21–32)
CREAT SERPL-MCNC: 1.53 MG/DL (ref 0.6–1.3)
GFR SERPL CREATININE-BSD FRML MDRD: 37 ML/MIN/1.73SQ M
GLUCOSE SERPL-MCNC: 165 MG/DL (ref 65–140)
POTASSIUM SERPL-SCNC: 3.8 MMOL/L (ref 3.5–5.3)
SODIUM SERPL-SCNC: 136 MMOL/L (ref 136–145)
TSH SERPL DL<=0.05 MIU/L-ACNC: 70.4 UIU/ML (ref 0.36–3.74)

## 2018-04-16 PROCEDURE — 86800 THYROGLOBULIN ANTIBODY: CPT

## 2018-04-16 PROCEDURE — 36415 COLL VENOUS BLD VENIPUNCTURE: CPT

## 2018-04-16 PROCEDURE — 84432 ASSAY OF THYROGLOBULIN: CPT

## 2018-04-16 PROCEDURE — 80048 BASIC METABOLIC PNL TOTAL CA: CPT

## 2018-04-16 PROCEDURE — 84443 ASSAY THYROID STIM HORMONE: CPT

## 2018-04-16 NOTE — OP NOTE
OPERATIVE REPORT  PATIENT NAME: Uriel Casey    :  1960  MRN: [id number]  Pt Location: AN OR ROOM 04    SURGERY DATE: 3/16/2018     Surgeon(s) and Role:     * Basilio Mcwilliams MD - Primary     * Singh Pacheco PA-C - Assisting (No qualified surgical resident available for assistance)    Preop Diagnosis:     * Thyroid carcinoma ([address] ) [C73]     * Secondary malignancy of lymph nodes of head, face and neck ([address] ) [C77 0]    Post-Op Diagnosis Codes: * Thyroid carcinoma ([address] ) [C73]     * Secondary malignancy of lymph nodes of head, face and neck ([address] ) [C77 0]    Procedure(s):  TOTAL THYROIDECTOMY  BILATERAL CENTRAL COMPARTMENT NECK DISSECTIONS  LEFT MODIFIED RADICAL NECK DISSECTION    Specimen(s):  ID Type Source Tests Collected by Time Destination   1 : Total thyroid Tissue Thyroid TISSUE Leobardo Mcwilliams MD 3/16/2018 1034    2 : right central compartment Tissue Neck TISSUE EXAM Basilio Mcwilliams MD 3/16/2018 1045    3 : left central compartment Tissue Neck TISSUE EXAM Basilio Mcwilliams MD 3/16/2018 1046    4 : left neck dissection, levels 3 and 4 as marked Tissue Neck TISSUE Luisito Ramirez MD 3/16/2018 1140        Estimated Blood Loss:   150 mL    Drains:  Closed/Suction Drain Left Neck Bulb (Active)   Site Description Healing 3/17/2018  1:18 AM   Dressing Status Clean;Dry; Intact 3/17/2018  1:18 AM   Drainage Appearance Serosanguineous 3/17/2018  5:55 AM   Status To bulb suction 3/17/2018  1:18 AM   Output (mL) 30 mL 3/17/2018 11:01 AM   Number of days: 31       Anesthesia Type:   General    Operative Indications:  61year old woman with biopsy proven papillary thyroid carcinoma, metastatic to left cervical node  As such, total thyroidectomy, central compartment neck dissection, and left modified radical neck dissection was indicated  Operative Findings:  Large nodule replacing left lobe, with overlying sternothyroid muscle adherent  Bilateral recurrent laryngeal nerves identified and preserved    Bilateral upper parathyroid glands identified and preserved  No grossly pathologic nodes in central compartment or left neck, one mildly enlarged lymph node left level 3 consistent with pathologic node confirmed by needle aspiration biopsy  Complications:   None    Procedure and Technique:  The patient was positively identified and transferred onto the operating table in the supine position  Appropriate monitoring devices were put in place, anesthesia was induced and the patient was intubated without difficulty  A shoulder roll was placed, and the patients neck was examined  An appropriate site for skin incision was demarcated 1cm below the cricoid cartilage, extending across the left neck  Before proceeding further, the timeout process was completed  Local anesthesia in the form of 1% lidocaine with 1/100,000 epinephrine was then injected to the marked site  The patients neck was then prepped and draped in the usual sterile fashion  Skin incision was then made at the marked site in a transverse fashion using a 15 blade  Dissection continued through subcutaneous tissues and platysma using the 15 blade and Bovie cautery  Superficial flaps were then elevated in the subplatysmal plane using Bovie cautery, and these flaps were held in a retracted position using 2-0 silk stitches  Dissection then continued in midline in between strap musculature using DeBakey forceps and Bovie cautery  Strap muscles were then elevated off the underlying thyroid gland on the left side using Bovie cautery and blunt dissection  Sternothyroid musculature was noted to be adherent to the left lobe, and as such this muscle was divided leaving a portion attached to the gland  The sternohyoid muscle was then held in a retracted position using an [employer] [place] retractor  Dissection then continued around the lobe of the thyroid gland, immediately adjacent to the capsule of the gland using bipolar cautery  A large nodule was encountered  Maulik Boyer Dissection continued along the superior aspect of the isthmus, extending along the medial aspect of the upper pole  The superior pole was retracted medially and inferiorly, and dissection continued around the lateral aspect of the superior lobe  The superior pedicle was divided using bipolar cautery  This allowed further reflection and retraction of the gland medially, and dissection continued inferiorly, with care taken to remain as close as possible to the capsule of the gland to minimize risk of injury or sacrifice of parathyroid glands  With continued dissection, the recurrent laryngeal nerve was identified  Remaining tissue attachments at [facility] ligament were then divided using bipolar cautery, with care taken to limit extent of dissection at the point of entry of the recurrent laryngeal nerve  Remaining attachments to the anterior trachea were divided using bipolar cautery  Attention was then turned to the right lobe  Strap muscles were elevated off the underlying thyroid gland on the right side using Bovie cautery and blunt dissection  The strap muscles were then held in a retracted position using an [employer] [place] retractor  Dissection then continued around the lobe of the thyroid gland, immediately adjacent to the capsule of the gland using bipolar cautery  This dissection was carried out along the superior aspect of the isthmus, extending along the medial aspect of the upper pole, while retracting the left lobe and isthmus toward the left  The superior pole was retracted medially and inferiorly, and dissection continued around the lateral aspect of the superior lobe  The superior pedicle was divided using bipolar cautery  This allowed further reflection and retraction of the gland medially, and dissection continued inferiorly, with care taken to remain as close as possible to the capsule of the gland to minimize risk of injury or sacrifice of parathyroid glands    With continued dissection, the recurrent laryngeal nerve was identified  Remaining tissue attachments at [facility] ligament were then divided using bipolar cautery, with care taken to limit extent of dissection at the point of entry of the recurrent laryngeal nerve  Remaining attachments to the anterior trachea were divided using bipolar cautery, and the entire thyroid gland was removed, and carefully examined  No adherent parathyroid glands were noted, and the gland was set aside to send to pathology for permanent section evaluation  Attention was directed to the central compartment inferiorly  Starting on the right side, dissection was carried out inferiorly along the vascular bundle laterally, and along the trachea in midline using mosquito forceps and bipolar cautery  Dissection was then carried out inferiorly along the course of the recurrent laryngeal nerve  Fibrofatty tissue containing lymph nodes was then grasped using Allis forceps, and tissue attachments inferiorly were divided using bipolar cautery, allowing removal of the right central compartment neck dissection specimen  Attention was turned to the left central compartment  In similar fashion, dissection was carried out inferiorly along the vascular bundle laterally, and along the trachea in midline using mosquito forceps and bipolar cautery  Dissection was then carried out inferiorly along the course of the recurrent laryngeal nerve  Fibrofatty tissue containing lymph nodes was then grasped using Allis forceps, and tissue attachments inferiorly were divided using bipolar cautery, allowing removal of the left central compartment neck dissection specimen  Of note, no grossly pathologic nodes were visualized or palpated in either central compartment specimen  Attention was directed to the left neck  Dissection then continued superiorly using Bovie cautery along the inferior aspect of the submandibular gland down to the digastric muscle    Dissection then continued along the course of the digastric muscle back to the sternocleidomastoid muscle  Fascia overlying the sternocleidomastoid muscle was then divided using Bovie cautery, and the external jugular vein was also divided and ligated using a 3-0 silk stitch  Fascia was grasped using multiple Allis clamps, and dissection continued around the anterior aspect of the sternocleidomastoid muscle onto it's medial surface using Bovie cautery  With continued dissection the spinal accessory nerve was encountered  Dissection was carried out along the course of the nerve down to the deep cervical fascia  Dissection then continued down to the deep cervical fascia along the posterior border of the sternocleidomastoid muscle  This dissection was carried inferiorly beyond the level of the omohyoid muscle, which was divided to allow more complete dissection  Fibrofatty tissue was then grasped using multiple Allis clamps and retracted anteriorly, and dissection continued anteriorly toward the vascular bundle using blunt dissection and Bovie cautery  Care was taken to divide exiting cervical rootlets distally, and only when necessary, and care was also taken to leave the phrenic nerve undisturbed  Inferiorly soft tissue was clamped and tied to minimize risk of a chyle leak  Through the course of dissection no grossly pathologic nodes were encountered, but one enlarged node consistent with the node seen on ultrasound and biopsied was encountered in level 3  Fibrofatty tissue was then elevated off the carotid artery, vagus nerve and jugular vein using Mosquito forceps and Bovie cautery  Some branches entering the jugular vein anteriorly were divided and ligated using 3-0 silk stitches  Dissection continued anterior to the vascular bundle, skeletonizing strap musculature  Remaining tissue attachments anteriorly were divided along the course of the omohyoid muscle, allowing removal of the neck dissection specimen    The specimen was oriented and sent to pathology for inking and permanent section evaluation  The surgical site was irrigated with saline, and hemostasis was ensured using bipolar cautery  Raysa hemostatic agent was applied  A 7 mm Mikhail Ling drain was placed through a separate incision on the left  The surgical site was then closed in multiple layers  Strap muscles were re-approximated across midline using 3-0 Vicryl stitches in a running fashion, and the same stitch was used in an interrupted fashion to reapproximate the plastysma and tissue in midline at the same plane  Skin was closed using a 4-0 Monocryl stitch in a running sub-cuticular fashion, followed by a Steristrip  Anesthesia was then reversed, and the patient was awakened, extubated and taken to the recovery room in stable condition  All counts were correct at the end of the case, and no complications were encountered  I was present for the entire procedure      Patient Disposition:  PACU  and extubated and stable    SIGNATURE: Issa Clarke MD  DATE: April 16, 2018  TIME: 11:30 AM

## 2018-04-17 LAB — THYROGLOB AB SERPL-ACNC: 3.3 IU/ML (ref 0–0.9)

## 2018-04-18 ENCOUNTER — HOSPITAL ENCOUNTER (OUTPATIENT)
Dept: RADIOLOGY | Age: 58
Discharge: HOME | End: 2018-04-18
Payer: COMMERCIAL

## 2018-04-18 DIAGNOSIS — C73 MALIGNANT NEOPLASM OF THYROID GLAND (HCC): ICD-10-CM

## 2018-04-19 ENCOUNTER — HOSPITAL ENCOUNTER (OUTPATIENT)
Dept: RADIOLOGY | Facility: HOSPITAL | Age: 58
Discharge: HOME | End: 2018-04-19
Payer: COMMERCIAL

## 2018-04-19 ENCOUNTER — HOSPITAL ENCOUNTER (OUTPATIENT)
Dept: RADIOLOGY | Age: 58
Discharge: HOME | End: 2018-04-19
Payer: COMMERCIAL

## 2018-04-19 DIAGNOSIS — C73 MALIGNANT NEOPLASM OF THYROID GLAND (HCC): ICD-10-CM

## 2018-04-19 PROCEDURE — A9509 IODINE I-123 SOD IODIDE MIL: HCPCS

## 2018-04-19 PROCEDURE — 78018 THYROID MET IMAGING BODY: CPT

## 2018-04-19 PROCEDURE — 79005 NUCLEAR RX ORAL ADMIN: CPT

## 2018-04-19 PROCEDURE — A9517 I131 IODIDE CAP, RX: HCPCS

## 2018-04-22 LAB
THYROGLOB AB SERPL-ACNC: 3 IU/ML (ref 0–0.9)
THYROGLOB SERPL-MCNC: <2 NG/ML

## 2018-04-24 ENCOUNTER — HOSPITAL ENCOUNTER (OUTPATIENT)
Dept: RADIOLOGY | Facility: MEDICAL CENTER | Age: 58
Discharge: HOME | End: 2018-04-24
Payer: COMMERCIAL

## 2018-04-24 DIAGNOSIS — Z12.31 ENCOUNTER FOR SCREENING MAMMOGRAM FOR MALIGNANT NEOPLASM OF BREAST: ICD-10-CM

## 2018-04-24 PROCEDURE — 77067 SCR MAMMO BI INCL CAD: CPT

## 2018-04-24 PROCEDURE — 77063 BREAST TOMOSYNTHESIS BI: CPT

## 2018-04-26 ENCOUNTER — HOSPITAL ENCOUNTER (OUTPATIENT)
Dept: RADIOLOGY | Age: 58
Discharge: HOME | End: 2018-04-26
Payer: COMMERCIAL

## 2018-04-26 DIAGNOSIS — C73 MALIGNANT NEOPLASM OF THYROID GLAND (HCC): ICD-10-CM

## 2018-04-26 PROCEDURE — 78018 THYROID MET IMAGING BODY: CPT

## 2018-04-27 ENCOUNTER — APPOINTMENT (OUTPATIENT)
Dept: LAB | Facility: CLINIC | Age: 58
End: 2018-04-27
Payer: COMMERCIAL

## 2018-04-27 ENCOUNTER — TRANSCRIBE ORDERS (OUTPATIENT)
Dept: LAB | Facility: CLINIC | Age: 58
End: 2018-04-27

## 2018-04-27 DIAGNOSIS — E83.42 HYPOMAGNESEMIA: Primary | ICD-10-CM

## 2018-04-27 DIAGNOSIS — E55.9 AVITAMINOSIS D: ICD-10-CM

## 2018-04-27 DIAGNOSIS — E89.2 STATUS POST PARATHYROIDECTOMY (HCC): ICD-10-CM

## 2018-04-27 DIAGNOSIS — E83.42 HYPOMAGNESEMIA: ICD-10-CM

## 2018-04-27 LAB
ALBUMIN SERPL BCP-MCNC: 4 G/DL (ref 3.5–5)
ALP SERPL-CCNC: 105 U/L (ref 46–116)
ALT SERPL W P-5'-P-CCNC: 42 U/L (ref 12–78)
ANION GAP SERPL CALCULATED.3IONS-SCNC: 7 MMOL/L (ref 4–13)
AST SERPL W P-5'-P-CCNC: 21 U/L (ref 5–45)
BILIRUB SERPL-MCNC: 0.4 MG/DL (ref 0.2–1)
BUN SERPL-MCNC: 20 MG/DL (ref 5–25)
CA-I BLD-SCNC: 1.09 MMOL/L (ref 1.12–1.32)
CALCIUM SERPL-MCNC: 8.9 MG/DL (ref 8.3–10.1)
CHLORIDE SERPL-SCNC: 103 MMOL/L (ref 100–108)
CO2 SERPL-SCNC: 31 MMOL/L (ref 21–32)
CREAT SERPL-MCNC: 1.17 MG/DL (ref 0.6–1.3)
GFR SERPL CREATININE-BSD FRML MDRD: 51 ML/MIN/1.73SQ M
GLUCOSE SERPL-MCNC: 103 MG/DL (ref 65–140)
MAGNESIUM SERPL-MCNC: 1.9 MG/DL (ref 1.6–2.6)
PHOSPHATE SERPL-MCNC: 3.8 MG/DL (ref 2.7–4.5)
POTASSIUM SERPL-SCNC: 4.2 MMOL/L (ref 3.5–5.3)
PROT SERPL-MCNC: 7.6 G/DL (ref 6.4–8.2)
PTH-INTACT SERPL-MCNC: 20.3 PG/ML (ref 18.4–80.1)
SODIUM SERPL-SCNC: 141 MMOL/L (ref 136–145)

## 2018-04-27 PROCEDURE — 36415 COLL VENOUS BLD VENIPUNCTURE: CPT

## 2018-04-27 PROCEDURE — 83970 ASSAY OF PARATHORMONE: CPT

## 2018-04-27 PROCEDURE — 80053 COMPREHEN METABOLIC PANEL: CPT

## 2018-04-27 PROCEDURE — 83735 ASSAY OF MAGNESIUM: CPT

## 2018-04-27 PROCEDURE — 82330 ASSAY OF CALCIUM: CPT

## 2018-04-27 PROCEDURE — 84100 ASSAY OF PHOSPHORUS: CPT

## 2018-05-24 DIAGNOSIS — E78.01 FAMILIAL HYPERCHOLESTEROLEMIA: Primary | ICD-10-CM

## 2018-05-24 RX ORDER — ATORVASTATIN CALCIUM 20 MG/1
TABLET, FILM COATED ORAL
Qty: 90 TABLET | Refills: 0 | Status: SHIPPED | OUTPATIENT
Start: 2018-05-24 | End: 2018-11-06 | Stop reason: SDUPTHER

## 2018-05-30 ENCOUNTER — OFFICE VISIT (OUTPATIENT)
Dept: FAMILY MEDICINE CLINIC | Facility: CLINIC | Age: 58
End: 2018-05-30
Payer: COMMERCIAL

## 2018-05-30 VITALS
DIASTOLIC BLOOD PRESSURE: 76 MMHG | RESPIRATION RATE: 16 BRPM | SYSTOLIC BLOOD PRESSURE: 122 MMHG | TEMPERATURE: 98.5 F | WEIGHT: 216 LBS | BODY MASS INDEX: 32.74 KG/M2 | HEIGHT: 68 IN | HEART RATE: 78 BPM

## 2018-05-30 DIAGNOSIS — Z00.00 WELL ADULT HEALTH CHECK: Primary | ICD-10-CM

## 2018-05-30 DIAGNOSIS — E55.9 VITAMIN D DEFICIENCY: ICD-10-CM

## 2018-05-30 DIAGNOSIS — C73 THYROID CANCER (HCC): ICD-10-CM

## 2018-05-30 DIAGNOSIS — E78.01 FAMILIAL HYPERCHOLESTEROLEMIA: ICD-10-CM

## 2018-05-30 PROCEDURE — 99396 PREV VISIT EST AGE 40-64: CPT | Performed by: INTERNAL MEDICINE

## 2018-05-30 RX ORDER — LEVOTHYROXINE SODIUM 175 MCG
175 TABLET ORAL DAILY
COMMUNITY
Start: 2018-04-12 | End: 2018-09-11 | Stop reason: ALTCHOICE

## 2018-05-30 NOTE — PROGRESS NOTES
ASSESSMENT and PLAN:  Rocío Leigh is a 61 y o  female with:   Problem List Items Addressed This Visit     Well adult health check - Primary    Familial hypercholesterolemia    Relevant Orders    Comprehensive metabolic panel    CBC and differential    Lipid panel    Vitamin D deficiency    Relevant Orders    Vitamin D 25 hydroxy    CBC and differential    Thyroid cancer ([facility] [address] )    Relevant Medications    SYNTHROID 175 MCG tablet    Other Relevant Orders    TSH, 3rd generation    T4, free          SUBJECTIVE:  Rocío Leigh is a 61 y o  female who presents today with a chief complaint of Well Check    Patient here for annual exam;  She feels well; She is taking calcium 4 per day  she was on omeprazole daily but had low calcium-  But it worked better than famotadine   Her mammogram is up to date; Her tetanus shot is up to date  Review of Systems   Constitutional: Negative  HENT: Negative  Eyes: Negative  Respiratory: Negative  Cardiovascular: Negative  Gastrointestinal: Negative  Endocrine: Negative  Genitourinary: Negative  Musculoskeletal: Negative  Skin: Negative  Allergic/Immunologic: Negative  Neurological: Negative  Hematological: Negative  Psychiatric/Behavioral: Negative  All other systems reviewed and are negative  I have reviewed the patient's PMH, Social History, Medication List and Allergies  OBJECTIVE:  /76   Pulse 78   Temp 98 5 °F (36 9 °C)   Resp 16   Ht 5' 7 7" (1 72 m)   Wt 98 kg (216 lb)   BMI 33 13 kg/m²   Physical Exam   Constitutional: She is oriented to person, place, and time  She appears well-developed and well-nourished  No distress  HENT:   Head: Normocephalic and atraumatic  Right Ear: External ear normal    Left Ear: External ear normal    Nose: Nose normal    Mouth/Throat: Oropharynx is clear and moist    Eyes: Conjunctivae and EOM are normal  Pupils are equal, round, and reactive to light   Right eye exhibits no discharge  Left eye exhibits no discharge  Neck: Normal range of motion  Neck supple  No JVD present  No tracheal deviation present  No thyromegaly present  Cardiovascular: Normal rate, regular rhythm, normal heart sounds and intact distal pulses  Exam reveals no gallop  No murmur heard  Pulmonary/Chest: Effort normal and breath sounds normal  No respiratory distress  She has no wheezes  Abdominal: Soft  Bowel sounds are normal    Musculoskeletal: Normal range of motion  She exhibits no edema or tenderness  Neurological: She is alert and oriented to person, place, and time  She has normal reflexes  No cranial nerve deficit  Skin: Skin is warm and dry  No rash noted  No erythema  Healed scar on neck    Psychiatric: She has a normal mood and affect  Her behavior is normal  Judgment and thought content normal    Nursing note and vitals reviewed

## 2018-06-13 ENCOUNTER — TRANSCRIBE ORDERS (OUTPATIENT)
Dept: LAB | Facility: CLINIC | Age: 58
End: 2018-06-13

## 2018-06-13 ENCOUNTER — APPOINTMENT (OUTPATIENT)
Dept: LAB | Facility: CLINIC | Age: 58
End: 2018-06-13
Payer: COMMERCIAL

## 2018-06-13 DIAGNOSIS — C73 THYROID CANCER (HCC): ICD-10-CM

## 2018-06-13 DIAGNOSIS — E89.0 POSTSURGICAL HYPOTHYROIDISM: Primary | ICD-10-CM

## 2018-06-13 DIAGNOSIS — E89.0 POSTSURGICAL HYPOTHYROIDISM: ICD-10-CM

## 2018-06-13 DIAGNOSIS — E78.01 FAMILIAL HYPERCHOLESTEROLEMIA: ICD-10-CM

## 2018-06-13 DIAGNOSIS — E55.9 VITAMIN D DEFICIENCY: ICD-10-CM

## 2018-06-13 LAB
25(OH)D3 SERPL-MCNC: 48.4 NG/ML (ref 30–100)
ALBUMIN SERPL BCP-MCNC: 3.7 G/DL (ref 3.5–5)
ALP SERPL-CCNC: 102 U/L (ref 46–116)
ALT SERPL W P-5'-P-CCNC: 50 U/L (ref 12–78)
ANION GAP SERPL CALCULATED.3IONS-SCNC: 5 MMOL/L (ref 4–13)
AST SERPL W P-5'-P-CCNC: 27 U/L (ref 5–45)
BASOPHILS # BLD AUTO: 0.04 THOUSANDS/ΜL (ref 0–0.1)
BASOPHILS NFR BLD AUTO: 1 % (ref 0–1)
BILIRUB SERPL-MCNC: 0.36 MG/DL (ref 0.2–1)
BUN SERPL-MCNC: 21 MG/DL (ref 5–25)
CALCIUM SERPL-MCNC: 8.7 MG/DL (ref 8.3–10.1)
CHLORIDE SERPL-SCNC: 108 MMOL/L (ref 100–108)
CHOLEST SERPL-MCNC: 136 MG/DL (ref 50–200)
CO2 SERPL-SCNC: 28 MMOL/L (ref 21–32)
CREAT SERPL-MCNC: 0.91 MG/DL (ref 0.6–1.3)
EOSINOPHIL # BLD AUTO: 0.07 THOUSAND/ΜL (ref 0–0.61)
EOSINOPHIL NFR BLD AUTO: 2 % (ref 0–6)
ERYTHROCYTE [DISTWIDTH] IN BLOOD BY AUTOMATED COUNT: 13.2 % (ref 11.6–15.1)
GFR SERPL CREATININE-BSD FRML MDRD: 70 ML/MIN/1.73SQ M
GLUCOSE P FAST SERPL-MCNC: 95 MG/DL (ref 65–99)
HCT VFR BLD AUTO: 40.4 % (ref 34.8–46.1)
HDLC SERPL-MCNC: 48 MG/DL (ref 40–60)
HGB BLD-MCNC: 13 G/DL (ref 11.5–15.4)
IMM GRANULOCYTES # BLD AUTO: 0.01 THOUSAND/UL (ref 0–0.2)
IMM GRANULOCYTES NFR BLD AUTO: 0 % (ref 0–2)
LDLC SERPL CALC-MCNC: 72 MG/DL (ref 0–100)
LYMPHOCYTES # BLD AUTO: 1.08 THOUSANDS/ΜL (ref 0.6–4.47)
LYMPHOCYTES NFR BLD AUTO: 25 % (ref 14–44)
MCH RBC QN AUTO: 29.5 PG (ref 26.8–34.3)
MCHC RBC AUTO-ENTMCNC: 32.2 G/DL (ref 31.4–37.4)
MCV RBC AUTO: 92 FL (ref 82–98)
MONOCYTES # BLD AUTO: 0.42 THOUSAND/ΜL (ref 0.17–1.22)
MONOCYTES NFR BLD AUTO: 10 % (ref 4–12)
NEUTROPHILS # BLD AUTO: 2.72 THOUSANDS/ΜL (ref 1.85–7.62)
NEUTS SEG NFR BLD AUTO: 62 % (ref 43–75)
NONHDLC SERPL-MCNC: 88 MG/DL
NRBC BLD AUTO-RTO: 0 /100 WBCS
PLATELET # BLD AUTO: 225 THOUSANDS/UL (ref 149–390)
PMV BLD AUTO: 11.3 FL (ref 8.9–12.7)
POTASSIUM SERPL-SCNC: 4.3 MMOL/L (ref 3.5–5.3)
PROT SERPL-MCNC: 7.3 G/DL (ref 6.4–8.2)
RBC # BLD AUTO: 4.41 MILLION/UL (ref 3.81–5.12)
SODIUM SERPL-SCNC: 141 MMOL/L (ref 136–145)
T3FREE SERPL-MCNC: 3.46 PG/ML (ref 2.3–4.2)
T4 FREE SERPL-MCNC: 1.47 NG/DL (ref 0.76–1.46)
TRIGL SERPL-MCNC: 79 MG/DL
TSH SERPL DL<=0.05 MIU/L-ACNC: 0.03 UIU/ML (ref 0.36–3.74)
WBC # BLD AUTO: 4.34 THOUSAND/UL (ref 4.31–10.16)

## 2018-06-13 PROCEDURE — 84443 ASSAY THYROID STIM HORMONE: CPT

## 2018-06-13 PROCEDURE — 36415 COLL VENOUS BLD VENIPUNCTURE: CPT

## 2018-06-13 PROCEDURE — 84481 FREE ASSAY (FT-3): CPT

## 2018-06-13 PROCEDURE — 80061 LIPID PANEL: CPT

## 2018-06-13 PROCEDURE — 85025 COMPLETE CBC W/AUTO DIFF WBC: CPT

## 2018-06-13 PROCEDURE — 84439 ASSAY OF FREE THYROXINE: CPT

## 2018-06-13 PROCEDURE — 82306 VITAMIN D 25 HYDROXY: CPT

## 2018-06-13 PROCEDURE — 84432 ASSAY OF THYROGLOBULIN: CPT

## 2018-06-13 PROCEDURE — 80053 COMPREHEN METABOLIC PANEL: CPT

## 2018-06-13 PROCEDURE — 86800 THYROGLOBULIN ANTIBODY: CPT

## 2018-06-14 ENCOUNTER — TELEPHONE (OUTPATIENT)
Dept: FAMILY MEDICINE CLINIC | Facility: CLINIC | Age: 58
End: 2018-06-14

## 2018-06-14 NOTE — TELEPHONE ENCOUNTER
Spoke with pt   She states Dr Toribio Zapien, her endocrinologist from [facility], is managing her thyroid levels

## 2018-06-14 NOTE — TELEPHONE ENCOUNTER
----- Message from Pepper Jones,  sent at 6/14/2018  8:00 AM EDT -----  Please call the patient regarding her abnormal result  Cholesterol is great, rest of labs are very good- but thyroid is a little suppressed  We want it that way- is dr Fawad Brooks her thyroid levels? ??   Just confirm for me- thank you

## 2018-06-18 DIAGNOSIS — F34.1 DYSTHYMIA: Primary | ICD-10-CM

## 2018-06-18 RX ORDER — VENLAFAXINE 37.5 MG/1
37.5 TABLET ORAL 2 TIMES DAILY
Qty: 60 TABLET | Refills: 3
Start: 2018-06-18 | End: 2018-11-06 | Stop reason: SDUPTHER

## 2018-06-19 LAB
THYROGLOB AB SERPL-ACNC: 1.5 IU/ML (ref 0–0.9)
THYROGLOB SERPL-MCNC: <2 NG/ML

## 2018-08-16 DIAGNOSIS — F41.1 GAD (GENERALIZED ANXIETY DISORDER): ICD-10-CM

## 2018-08-16 RX ORDER — ALPRAZOLAM 0.25 MG/1
0.25 TABLET ORAL 2 TIMES DAILY PRN
Qty: 180 TABLET | Refills: 0 | Status: SHIPPED | OUTPATIENT
Start: 2018-08-16

## 2018-08-22 ENCOUNTER — APPOINTMENT (OUTPATIENT)
Dept: LAB | Facility: CLINIC | Age: 58
End: 2018-08-22
Payer: COMMERCIAL

## 2018-08-22 ENCOUNTER — TRANSCRIBE ORDERS (OUTPATIENT)
Dept: LAB | Facility: CLINIC | Age: 58
End: 2018-08-22

## 2018-08-22 DIAGNOSIS — E89.0 POSTSURGICAL HYPOTHYROIDISM: ICD-10-CM

## 2018-08-22 DIAGNOSIS — E83.42 HYPOMAGNESEMIA: ICD-10-CM

## 2018-08-22 DIAGNOSIS — E89.2 STATUS POST PARATHYROIDECTOMY (HCC): ICD-10-CM

## 2018-08-22 DIAGNOSIS — E78.2 MIXED HYPERLIPIDEMIA: ICD-10-CM

## 2018-08-22 DIAGNOSIS — C73 THYROID CANCER (HCC): ICD-10-CM

## 2018-08-22 DIAGNOSIS — E89.0 POSTSURGICAL HYPOTHYROIDISM: Primary | ICD-10-CM

## 2018-08-22 LAB
ANION GAP SERPL CALCULATED.3IONS-SCNC: 10 MMOL/L (ref 4–13)
BUN SERPL-MCNC: 17 MG/DL (ref 5–25)
CALCIUM SERPL-MCNC: 8.4 MG/DL (ref 8.3–10.1)
CHLORIDE SERPL-SCNC: 105 MMOL/L (ref 100–108)
CO2 SERPL-SCNC: 28 MMOL/L (ref 21–32)
CREAT SERPL-MCNC: 1.01 MG/DL (ref 0.6–1.3)
GFR SERPL CREATININE-BSD FRML MDRD: 61 ML/MIN/1.73SQ M
GLUCOSE SERPL-MCNC: 85 MG/DL (ref 65–140)
MAGNESIUM SERPL-MCNC: 1.9 MG/DL (ref 1.6–2.6)
PHOSPHATE SERPL-MCNC: 3.2 MG/DL (ref 2.7–4.5)
POTASSIUM SERPL-SCNC: 3.6 MMOL/L (ref 3.5–5.3)
PTH-INTACT SERPL-MCNC: 44.8 PG/ML (ref 18.4–80.1)
SODIUM SERPL-SCNC: 143 MMOL/L (ref 136–145)
T3FREE SERPL-MCNC: 2.42 PG/ML (ref 2.3–4.2)
T4 FREE SERPL-MCNC: 1.13 NG/DL (ref 0.76–1.46)
TSH SERPL DL<=0.05 MIU/L-ACNC: 0.09 UIU/ML (ref 0.36–3.74)

## 2018-08-22 PROCEDURE — 83735 ASSAY OF MAGNESIUM: CPT

## 2018-08-22 PROCEDURE — 86800 THYROGLOBULIN ANTIBODY: CPT

## 2018-08-22 PROCEDURE — 84481 FREE ASSAY (FT-3): CPT

## 2018-08-22 PROCEDURE — 36415 COLL VENOUS BLD VENIPUNCTURE: CPT

## 2018-08-22 PROCEDURE — 84100 ASSAY OF PHOSPHORUS: CPT

## 2018-08-22 PROCEDURE — 84439 ASSAY OF FREE THYROXINE: CPT

## 2018-08-22 PROCEDURE — 84432 ASSAY OF THYROGLOBULIN: CPT

## 2018-08-22 PROCEDURE — 80048 BASIC METABOLIC PNL TOTAL CA: CPT

## 2018-08-22 PROCEDURE — 83970 ASSAY OF PARATHORMONE: CPT

## 2018-08-22 PROCEDURE — 84443 ASSAY THYROID STIM HORMONE: CPT

## 2018-08-27 LAB
THYROGLOB AB SERPL-ACNC: 1 IU/ML (ref 0–0.9)
THYROGLOB SERPL-MCNC: <2 NG/ML

## 2018-09-11 ENCOUNTER — OFFICE VISIT (OUTPATIENT)
Dept: FAMILY MEDICINE CLINIC | Facility: CLINIC | Age: 58
End: 2018-09-11
Payer: COMMERCIAL

## 2018-09-11 VITALS
DIASTOLIC BLOOD PRESSURE: 80 MMHG | RESPIRATION RATE: 16 BRPM | BODY MASS INDEX: 34.67 KG/M2 | SYSTOLIC BLOOD PRESSURE: 126 MMHG | WEIGHT: 226 LBS | HEART RATE: 72 BPM | TEMPERATURE: 98.3 F

## 2018-09-11 DIAGNOSIS — F41.1 GAD (GENERALIZED ANXIETY DISORDER): Primary | ICD-10-CM

## 2018-09-11 DIAGNOSIS — E55.9 VITAMIN D DEFICIENCY: ICD-10-CM

## 2018-09-11 DIAGNOSIS — C73 THYROID CANCER (HCC): ICD-10-CM

## 2018-09-11 PROCEDURE — 1036F TOBACCO NON-USER: CPT | Performed by: INTERNAL MEDICINE

## 2018-09-11 PROCEDURE — 99213 OFFICE O/P EST LOW 20 MIN: CPT | Performed by: INTERNAL MEDICINE

## 2018-09-11 RX ORDER — CALCIUM CITRATE 1040MG
1 TABLET ORAL 2 TIMES DAILY
COMMUNITY

## 2018-09-11 RX ORDER — LEVOTHYROXINE SODIUM 137 UG/1
137 TABLET ORAL DAILY
COMMUNITY

## 2018-09-11 NOTE — PROGRESS NOTES
ASSESSMENT and PLAN:  Nya Ballard is a 60 y o  female with:   Problem List Items Addressed This Visit     SAVANA (generalized anxiety disorder) - Primary     Add xanax at night  No alcohol with it  updat jocelynn in 2 weeks  Start lower dose of levothyroxine 137   Call with progress         Vitamin D deficiency     Follow up vitamin d level         Thyroid cancer ([facility] [address] )     Continue on lower dose of levothryoxine 137 mcg  Follow up with tsh andt4  Follow up with endocrine         Relevant Medications    levothyroxine (SYNTHROID) 137 mcg tablet          SUBJECTIVE:  Nya Ballard is a 60 y o  female who presents today with a chief complaint of Medication Dose Change    Patient  Here for follow up; she has been more weepy  her tsh is suppressed due to hx of thyroid cancer  She is managed by endocrine and her dosage was lowered  She is concerned about her weight  Her  was concerned about her anxiety - but at that point her tsh was suppressed due to her hx of thyroid cancer- it may have been affecting her mood  Review of Systems   Constitutional: Negative  HENT: Negative  Eyes: Negative  Respiratory: Negative  Cardiovascular: Negative  Gastrointestinal: Negative  Endocrine: Negative  Genitourinary: Negative  Musculoskeletal: Negative  Skin: Negative  Allergic/Immunologic: Negative  Neurological: Negative  Hematological: Negative  Psychiatric/Behavioral: Positive for sleep disturbance (occasionally sleeping poorly  occas)  Negative for agitation, behavioral problems, confusion, decreased concentration, dysphoric mood, hallucinations, self-injury and suicidal ideas  The patient is nervous/anxious  I have reviewed the patient's PMH, Social History, Medication List and Allergies        OBJECTIVE:  /80   Pulse 72   Temp 98 3 °F (36 8 °C)   Resp 16   Wt 103 kg (226 lb)   BMI 34 67 kg/m²   Physical Exam   Constitutional: She is oriented to person, place, and time  She appears well-developed and well-nourished  HENT:   Head: Normocephalic and atraumatic  Right Ear: External ear normal    Left Ear: External ear normal    Nose: Nose normal    Mouth/Throat: Oropharynx is clear and moist    Eyes: Conjunctivae and EOM are normal  Pupils are equal, round, and reactive to light  Right eye exhibits no discharge  Left eye exhibits no discharge  Neck: Normal range of motion  Neck supple  No JVD present  No tracheal deviation present  No thyromegaly present  Cardiovascular: Normal rate, regular rhythm, normal heart sounds and intact distal pulses  No murmur heard  Pulmonary/Chest: Effort normal and breath sounds normal  No respiratory distress  She has no wheezes  She has no rales  Abdominal: Soft  Bowel sounds are normal  She exhibits no distension  There is no tenderness  Musculoskeletal: Normal range of motion  She exhibits no edema or tenderness  Lymphadenopathy:     She has no cervical adenopathy  Neurological: She is alert and oriented to person, place, and time  She has normal reflexes  No cranial nerve deficit  Coordination normal    Skin: Skin is warm and dry  No rash noted  No erythema  Psychiatric: She has a normal mood and affect  Her behavior is normal  Judgment and thought content normal    Nursing note and vitals reviewed

## 2018-09-12 NOTE — ASSESSMENT & PLAN NOTE
Add xanax at night  No alcohol with it  updat jocelynn in 2 weeks  Start lower dose of levothyroxine 137   Call with progress

## 2018-09-26 ENCOUNTER — TELEPHONE (OUTPATIENT)
Dept: FAMILY MEDICINE CLINIC | Facility: CLINIC | Age: 58
End: 2018-09-26

## 2018-09-26 NOTE — TELEPHONE ENCOUNTER
Patient's  called   He wanted to let you know that she is sleeping much better and she is not as fatigued during the day

## 2018-10-29 ENCOUNTER — HOSPITAL ENCOUNTER (OUTPATIENT)
Dept: RADIOLOGY | Age: 58
Discharge: HOME | End: 2018-10-29
Payer: COMMERCIAL

## 2018-10-29 ENCOUNTER — APPOINTMENT (OUTPATIENT)
Dept: LAB | Age: 58
End: 2018-10-29
Payer: COMMERCIAL

## 2018-10-29 ENCOUNTER — TRANSCRIBE ORDERS (OUTPATIENT)
Dept: ADMINISTRATIVE | Age: 58
End: 2018-10-29

## 2018-10-29 DIAGNOSIS — C73 MALIGNANT NEOPLASM OF THYROID GLAND (HCC): ICD-10-CM

## 2018-10-29 DIAGNOSIS — E89.0 POSTSURGICAL HYPOTHYROIDISM: ICD-10-CM

## 2018-10-29 DIAGNOSIS — C73 THYROID CANCER (HCC): ICD-10-CM

## 2018-10-29 DIAGNOSIS — C73 MALIGNANT NEOPLASM OF THYROID GLAND (HCC): Primary | ICD-10-CM

## 2018-10-29 LAB
ALBUMIN SERPL BCP-MCNC: 4.1 G/DL (ref 3.5–5)
ALP SERPL-CCNC: 110 U/L (ref 46–116)
ALT SERPL W P-5'-P-CCNC: 43 U/L (ref 12–78)
ANION GAP SERPL CALCULATED.3IONS-SCNC: 6 MMOL/L (ref 4–13)
AST SERPL W P-5'-P-CCNC: 23 U/L (ref 5–45)
BILIRUB SERPL-MCNC: 0.3 MG/DL (ref 0.2–1)
BUN SERPL-MCNC: 17 MG/DL (ref 5–25)
CALCIUM SERPL-MCNC: 9.3 MG/DL (ref 8.3–10.1)
CHLORIDE SERPL-SCNC: 103 MMOL/L (ref 100–108)
CO2 SERPL-SCNC: 30 MMOL/L (ref 21–32)
CREAT SERPL-MCNC: 1.01 MG/DL (ref 0.6–1.3)
GFR SERPL CREATININE-BSD FRML MDRD: 61 ML/MIN/1.73SQ M
GLUCOSE SERPL-MCNC: 86 MG/DL (ref 65–140)
POTASSIUM SERPL-SCNC: 4.2 MMOL/L (ref 3.5–5.3)
PROT SERPL-MCNC: 7.9 G/DL (ref 6.4–8.2)
SODIUM SERPL-SCNC: 139 MMOL/L (ref 136–145)
T3FREE SERPL-MCNC: 2.44 PG/ML (ref 2.3–4.2)
T4 FREE SERPL-MCNC: 1.05 NG/DL (ref 0.76–1.46)
TSH SERPL DL<=0.05 MIU/L-ACNC: 0.25 UIU/ML (ref 0.36–3.74)

## 2018-10-29 PROCEDURE — 84439 ASSAY OF FREE THYROXINE: CPT

## 2018-10-29 PROCEDURE — 84432 ASSAY OF THYROGLOBULIN: CPT

## 2018-10-29 PROCEDURE — 80053 COMPREHEN METABOLIC PANEL: CPT

## 2018-10-29 PROCEDURE — 76536 US EXAM OF HEAD AND NECK: CPT

## 2018-10-29 PROCEDURE — 36415 COLL VENOUS BLD VENIPUNCTURE: CPT

## 2018-10-29 PROCEDURE — 84481 FREE ASSAY (FT-3): CPT

## 2018-10-29 PROCEDURE — 84443 ASSAY THYROID STIM HORMONE: CPT

## 2018-10-29 PROCEDURE — 86800 THYROGLOBULIN ANTIBODY: CPT

## 2018-11-02 LAB — SCAN RESULT: NORMAL

## 2018-11-06 DIAGNOSIS — K21.9 GASTROESOPHAGEAL REFLUX DISEASE WITHOUT ESOPHAGITIS: Primary | ICD-10-CM

## 2018-11-06 DIAGNOSIS — F34.1 DYSTHYMIA: ICD-10-CM

## 2018-11-06 DIAGNOSIS — E78.01 FAMILIAL HYPERCHOLESTEROLEMIA: ICD-10-CM

## 2018-11-06 RX ORDER — ATORVASTATIN CALCIUM 20 MG/1
TABLET, FILM COATED ORAL
Qty: 90 TABLET | Refills: 3 | Status: SHIPPED | OUTPATIENT
Start: 2018-11-06

## 2018-11-06 RX ORDER — VENLAFAXINE 37.5 MG/1
TABLET ORAL
Qty: 90 TABLET | Refills: 3 | Status: SHIPPED | OUTPATIENT
Start: 2018-11-06

## 2018-11-06 RX ORDER — OMEPRAZOLE 20 MG/1
CAPSULE, DELAYED RELEASE ORAL
Qty: 180 CAPSULE | Refills: 1 | Status: SHIPPED | OUTPATIENT
Start: 2018-11-06

## 2018-11-26 ENCOUNTER — HOSPITAL ENCOUNTER (OUTPATIENT)
Dept: RADIOLOGY | Age: 58
Discharge: HOME | End: 2018-11-26

## 2018-11-26 DIAGNOSIS — C73 MALIGNANT NEOPLASM OF THYROID GLAND (HCC): ICD-10-CM

## 2019-03-25 PROBLEM — K11.7 XEROSTOMIA: Status: ACTIVE | Noted: 2019-03-25

## 2019-04-05 ENCOUNTER — ANNUAL EXAM (OUTPATIENT)
Dept: OBGYN CLINIC | Facility: MEDICAL CENTER | Age: 59
End: 2019-04-05
Payer: COMMERCIAL

## 2019-04-05 VITALS
SYSTOLIC BLOOD PRESSURE: 140 MMHG | WEIGHT: 228.4 LBS | BODY MASS INDEX: 35.85 KG/M2 | DIASTOLIC BLOOD PRESSURE: 82 MMHG | HEIGHT: 67 IN

## 2019-04-05 DIAGNOSIS — Z12.39 SCREENING FOR MALIGNANT NEOPLASM OF BREAST: ICD-10-CM

## 2019-04-05 DIAGNOSIS — Z01.419 ENCOUNTER FOR GYNECOLOGICAL EXAMINATION (GENERAL) (ROUTINE) WITHOUT ABNORMAL FINDINGS: Primary | ICD-10-CM

## 2019-04-05 DIAGNOSIS — R92.2 DENSE BREAST: ICD-10-CM

## 2019-04-05 PROCEDURE — S0612 ANNUAL GYNECOLOGICAL EXAMINA: HCPCS | Performed by: OBSTETRICS & GYNECOLOGY

## 2019-04-05 RX ORDER — GLUCOSA SU 2KCL/CHONDROITIN SU 500-400 MG
CAPSULE ORAL
COMMUNITY

## 2019-04-08 ENCOUNTER — HOSPITAL ENCOUNTER (OUTPATIENT)
Dept: RADIOLOGY | Age: 59
Discharge: HOME | End: 2019-04-08
Payer: COMMERCIAL

## 2019-04-08 DIAGNOSIS — C73 MALIGNANT NEOPLASM OF THYROID GLAND (HCC): ICD-10-CM

## 2019-04-08 PROCEDURE — A9509 IODINE I-123 SOD IODIDE MIL: HCPCS

## 2019-04-08 PROCEDURE — 78018 THYROID MET IMAGING BODY: CPT

## 2019-04-08 RX ADMIN — THYROTROPIN ALFA 0.9 MG: 0.9 INJECTION, POWDER, FOR SOLUTION INTRAMUSCULAR at 09:05

## 2019-04-09 ENCOUNTER — HOSPITAL ENCOUNTER (OUTPATIENT)
Dept: RADIOLOGY | Age: 59
Discharge: HOME | End: 2019-04-09
Payer: COMMERCIAL

## 2019-04-09 RX ADMIN — THYROTROPIN ALFA 0.9 MG: 0.9 INJECTION, POWDER, FOR SOLUTION INTRAMUSCULAR at 09:05

## 2019-04-10 ENCOUNTER — HOSPITAL ENCOUNTER (OUTPATIENT)
Dept: RADIOLOGY | Facility: HOSPITAL | Age: 59
Discharge: HOME | End: 2019-04-10

## 2019-04-10 ENCOUNTER — TRANSCRIBE ORDERS (OUTPATIENT)
Dept: RADIOLOGY | Facility: HOSPITAL | Age: 59
End: 2019-04-10

## 2019-04-12 ENCOUNTER — APPOINTMENT (OUTPATIENT)
Dept: LAB | Facility: CLINIC | Age: 59
End: 2019-04-12
Payer: COMMERCIAL

## 2019-04-12 ENCOUNTER — TRANSCRIBE ORDERS (OUTPATIENT)
Dept: LAB | Facility: CLINIC | Age: 59
End: 2019-04-12

## 2019-04-12 DIAGNOSIS — C73 MALIGNANT NEOPLASM OF THYROID GLAND (HCC): ICD-10-CM

## 2019-04-12 DIAGNOSIS — C73 MALIGNANT NEOPLASM OF THYROID GLAND (HCC): Primary | ICD-10-CM

## 2019-04-12 LAB — TSH SERPL DL<=0.05 MIU/L-ACNC: 20.1 UIU/ML (ref 0.36–3.74)

## 2019-04-12 PROCEDURE — 36415 COLL VENOUS BLD VENIPUNCTURE: CPT

## 2019-04-12 PROCEDURE — 84432 ASSAY OF THYROGLOBULIN: CPT

## 2019-04-12 PROCEDURE — 84443 ASSAY THYROID STIM HORMONE: CPT

## 2019-04-12 PROCEDURE — 86800 THYROGLOBULIN ANTIBODY: CPT

## 2019-04-13 LAB
THYROGLOB AB SERPL-ACNC: <1 IU/ML (ref 0–0.9)
THYROGLOB SERPL-MCNC: <0.1 NG/ML (ref 1.5–38.5)

## 2019-05-07 ENCOUNTER — HOSPITAL ENCOUNTER (OUTPATIENT)
Dept: RADIOLOGY | Facility: MEDICAL CENTER | Age: 59
Discharge: HOME | End: 2019-05-07
Payer: COMMERCIAL

## 2019-05-07 VITALS — BODY MASS INDEX: 35.79 KG/M2 | HEIGHT: 67 IN | WEIGHT: 228 LBS

## 2019-05-07 DIAGNOSIS — R92.2 DENSE BREAST: ICD-10-CM

## 2019-05-07 DIAGNOSIS — Z12.39 SCREENING FOR MALIGNANT NEOPLASM OF BREAST: ICD-10-CM

## 2019-05-07 PROCEDURE — 77063 BREAST TOMOSYNTHESIS BI: CPT

## 2019-05-07 PROCEDURE — 77067 SCR MAMMO BI INCL CAD: CPT

## 2019-05-17 ENCOUNTER — HOSPITAL ENCOUNTER (OUTPATIENT)
Dept: RADIOLOGY | Facility: HOSPITAL | Age: 59
Discharge: HOME | End: 2019-05-17
Payer: COMMERCIAL

## 2019-05-17 ENCOUNTER — TELEPHONE (OUTPATIENT)
Dept: OBGYN CLINIC | Facility: CLINIC | Age: 59
End: 2019-05-17

## 2019-05-17 VITALS — BODY MASS INDEX: 34.56 KG/M2 | WEIGHT: 228 LBS | HEIGHT: 68 IN

## 2019-05-17 DIAGNOSIS — R92.8 ABNORMAL MAMMOGRAM: ICD-10-CM

## 2019-05-17 PROCEDURE — 76642 ULTRASOUND BREAST LIMITED: CPT

## 2019-05-17 PROCEDURE — G0279 TOMOSYNTHESIS, MAMMO: HCPCS

## 2019-05-17 PROCEDURE — 77065 DX MAMMO INCL CAD UNI: CPT

## 2019-10-03 DIAGNOSIS — K21.9 GASTROESOPHAGEAL REFLUX DISEASE WITHOUT ESOPHAGITIS: ICD-10-CM

## 2019-10-03 RX ORDER — OMEPRAZOLE 20 MG/1
CAPSULE, DELAYED RELEASE ORAL
Qty: 180 CAPSULE | Refills: 4 | OUTPATIENT
Start: 2019-10-03

## 2020-04-13 ENCOUNTER — TELEPHONE (OUTPATIENT)
Dept: OBGYN CLINIC | Facility: CLINIC | Age: 60
End: 2020-04-13

## 2020-04-13 ENCOUNTER — ANNUAL EXAM (OUTPATIENT)
Dept: OBGYN CLINIC | Facility: CLINIC | Age: 60
End: 2020-04-13
Payer: COMMERCIAL

## 2020-04-13 VITALS
SYSTOLIC BLOOD PRESSURE: 130 MMHG | HEIGHT: 68 IN | BODY MASS INDEX: 34.1 KG/M2 | DIASTOLIC BLOOD PRESSURE: 80 MMHG | WEIGHT: 225 LBS

## 2020-04-13 DIAGNOSIS — Z11.51 SPECIAL SCREENING EXAMINATION FOR HUMAN PAPILLOMAVIRUS (HPV): ICD-10-CM

## 2020-04-13 DIAGNOSIS — Z01.419 ENCOUNTER FOR WELL WOMAN EXAM: Primary | ICD-10-CM

## 2020-04-13 DIAGNOSIS — Z01.419 CERVICAL SMEAR, AS PART OF ROUTINE GYNECOLOGICAL EXAMINATION: ICD-10-CM

## 2020-04-13 DIAGNOSIS — Z12.31 BREAST CANCER SCREENING BY MAMMOGRAM: ICD-10-CM

## 2020-04-13 DIAGNOSIS — Z12.39 ENCOUNTER FOR SCREENING BREAST EXAMINATION: ICD-10-CM

## 2020-04-13 PROCEDURE — S0612 ANNUAL GYNECOLOGICAL EXAMINA: HCPCS | Performed by: PHYSICIAN ASSISTANT

## 2020-04-13 PROCEDURE — 87624 HPV HI-RISK TYP POOLED RSLT: CPT | Performed by: PHYSICIAN ASSISTANT

## 2020-04-13 PROCEDURE — G0145 SCR C/V CYTO,THINLAYER,RESCR: HCPCS | Performed by: PHYSICIAN ASSISTANT

## 2020-04-13 PROCEDURE — 3008F BODY MASS INDEX DOCD: CPT | Performed by: PHYSICIAN ASSISTANT

## 2020-04-16 LAB
HPV HR 12 DNA CVX QL NAA+PROBE: NEGATIVE
HPV16 DNA CVX QL NAA+PROBE: NEGATIVE
HPV18 DNA CVX QL NAA+PROBE: NEGATIVE

## 2020-04-20 LAB
LAB AP GYN PRIMARY INTERPRETATION: NORMAL
Lab: NORMAL

## 2020-10-27 ENCOUNTER — NURSE TRIAGE (OUTPATIENT)
Dept: OTHER | Facility: OTHER | Age: 60
End: 2020-10-27

## 2020-10-27 DIAGNOSIS — Z20.828 SARS-ASSOCIATED CORONAVIRUS EXPOSURE: Primary | ICD-10-CM

## 2021-03-10 DIAGNOSIS — Z23 ENCOUNTER FOR IMMUNIZATION: ICD-10-CM

## 2023-11-20 NOTE — TELEPHONE ENCOUNTER
Left message to have pt call the office back to schedule her colonoscopy at the [place] office with DR Hernandez    Patient informed and order is placed   Central scheduling will call patient to scheduled appt

## (undated) DEVICE — VIAL DECANTER

## (undated) DEVICE — TIBURON SPLIT SHEET: Brand: CONVERTORS

## (undated) DEVICE — SUT SILK 2-0 SH 30 IN K833H

## (undated) DEVICE — SYRINGE 10ML LL

## (undated) DEVICE — CHLORAPREP HI-LITE 26ML ORANGE

## (undated) DEVICE — GLOVE SRG BIOGEL ORTHOPEDIC 7

## (undated) DEVICE — 3M™ STERI-STRIP™ REINFORCED ADHESIVE SKIN CLOSURES, R1547, 1/2 IN X 4 IN (12 MM X 100 MM), 6 STRIPS/ENVELOPE: Brand: 3M™ STERI-STRIP™

## (undated) DEVICE — 3000CC GUARDIAN II: Brand: GUARDIAN

## (undated) DEVICE — PACK UNIVERSAL NECK

## (undated) DEVICE — SUT MONOCRYL 4-0 PS-2 18 IN Y496G

## (undated) DEVICE — 1840 FOAM BLOCK NEEDLE COUNTER: Brand: DEVON

## (undated) DEVICE — LIGHT HANDLE COVER SLEEVE DISP BLUE STELLAR

## (undated) DEVICE — CONMED ACCESSORY ELECTRODE, FLAT BLADE WITH EXTENDED INSULATION: Brand: CONMED

## (undated) DEVICE — SCD SEQUENTIAL COMPRESSION COMFORT SLEEVE MEDIUM KNEE LENGTH: Brand: KENDALL SCD

## (undated) DEVICE — REM POLYHESIVE ADULT PATIENT RETURN ELECTRODE: Brand: VALLEYLAB

## (undated) DEVICE — PLUMEPEN PRO 10FT

## (undated) DEVICE — INTENDED FOR TISSUE SEPARATION, AND OTHER PROCEDURES THAT REQUIRE A SHARP SURGICAL BLADE TO PUNCTURE OR CUT.: Brand: BARD-PARKER SAFETY BLADES SIZE 15, STERILE

## (undated) DEVICE — BIPOLAR CORD DISP

## (undated) DEVICE — NEEDLE 25G X 1 1/2

## (undated) DEVICE — SUT SILK 3-0 18 IN A184H

## (undated) DEVICE — SUT VICRYL 3-0 SH 27 IN J416H